# Patient Record
Sex: FEMALE | Race: WHITE | Employment: OTHER | URBAN - METROPOLITAN AREA
[De-identification: names, ages, dates, MRNs, and addresses within clinical notes are randomized per-mention and may not be internally consistent; named-entity substitution may affect disease eponyms.]

---

## 2017-01-09 ENCOUNTER — TELEPHONE (OUTPATIENT)
Dept: FAMILY MEDICINE CLINIC | Facility: CLINIC | Age: 22
End: 2017-01-09

## 2017-01-09 NOTE — TELEPHONE ENCOUNTER
Father placed pt on the phone as no BRNEDA on file. Pt states she continues to have fevers, around 100.5 and is feeling worse than when seen in office. Pt states she saw Dr. Bear Jauregui, infectious disease doctor, last Tuesday.  He did a blood test for iron and a

## 2017-01-09 NOTE — TELEPHONE ENCOUNTER
Discussed with patient and with Dr. Mehran Benjamin. Plan to proceed with indium scan. If negative consider rheumatology evaluation. In addition father questioning whether could be somatization from current stresses.   Patient states she is having more myalgias an

## 2017-01-09 NOTE — TELEPHONE ENCOUNTER
Patient has been experiencing fever and father is calling to obtain direction if patient should see Dr in office or what next steps she should take. Call [phone].

## 2017-01-24 ENCOUNTER — TELEPHONE (OUTPATIENT)
Dept: FAMILY MEDICINE CLINIC | Facility: CLINIC | Age: 22
End: 2017-01-24

## 2017-01-24 NOTE — TELEPHONE ENCOUNTER
Informed pt that we have not received the results as of yet. I also explained to pt that she can sign off on care everywhere and Dr. Terrance Krishna will have access to medical records at [address] [place] [address]. Pt voiced understanding.

## 2017-01-24 NOTE — TELEPHONE ENCOUNTER
Contacted Medical Records at [facility], was instructed to send fax cover sheet with requested information to Medical Records, [phone], was not given time frame for receipt of results.

## 2017-01-24 NOTE — TELEPHONE ENCOUNTER
No, I have not received them. Please call [facility], see if they will send. may require consent.

## 2017-01-24 NOTE — TELEPHONE ENCOUNTER
Katy's dad was in the office today. He wanted to know if you received Katy's Indium test results yet, it was ordered by Dr. Nishi Calderon and she had it done at [facility] 1/18/17 and 1/19/17.  Pls either let Sharyn Wiley know, [phone], or Angeles Baltazar [phone]

## 2017-01-26 ENCOUNTER — TELEPHONE (OUTPATIENT)
Dept: FAMILY MEDICINE CLINIC | Facility: CLINIC | Age: 22
End: 2017-01-26

## 2017-01-26 DIAGNOSIS — R51.9 RECURRENT HEADACHE: ICD-10-CM

## 2017-01-26 DIAGNOSIS — M54.2 NECK PAIN: ICD-10-CM

## 2017-01-26 DIAGNOSIS — R50.9 FEVER OF UNKNOWN ORIGIN: Primary | ICD-10-CM

## 2017-01-26 DIAGNOSIS — M06.1 ADULT STILL'S DISEASE (HCC): ICD-10-CM

## 2017-01-26 NOTE — TELEPHONE ENCOUNTER
Contacted Dr. Zepeda Child. He reports indium scan was normal.  Discussed with patient. She continues to experience daily fevers. They are now more frequent–2 or 3 times a day. And slightly higher T-max 100.6.   Continued headache and neck pain worse wit

## 2017-01-26 NOTE — TELEPHONE ENCOUNTER
Pt's mom Davi Rosen is calling wanting to inform Dr. Rishi Thomas that she did contact Dr. Jason Holden office but they would not schedule an appt for the pt because the pt does not have an official diagnosis.  It does not appear that mom is on pt's HIPAA

## 2017-01-27 ENCOUNTER — TELEPHONE (OUTPATIENT)
Dept: INTERNAL MEDICINE CLINIC | Facility: CLINIC | Age: 22
End: 2017-01-27

## 2017-01-27 NOTE — TELEPHONE ENCOUNTER
Reviewed doctor's recommendations with pt. Pt asking to have copies of lab results for rheumatology appt, she will  at [facility] 1/28/17. Pt rescheduled appt with Dr. Elizabeth Houston to Tuesday 1/31/17.

## 2017-01-31 ENCOUNTER — OFFICE VISIT (OUTPATIENT)
Dept: FAMILY MEDICINE CLINIC | Facility: CLINIC | Age: 22
End: 2017-01-31

## 2017-01-31 VITALS
DIASTOLIC BLOOD PRESSURE: 88 MMHG | RESPIRATION RATE: 18 BRPM | WEIGHT: 181.38 LBS | SYSTOLIC BLOOD PRESSURE: 136 MMHG | HEIGHT: 66.54 IN | HEART RATE: 111 BPM | TEMPERATURE: 99 F | BODY MASS INDEX: 28.81 KG/M2

## 2017-01-31 DIAGNOSIS — R50.9 FEVER OF UNKNOWN ORIGIN: Primary | ICD-10-CM

## 2017-01-31 PROCEDURE — 99212 OFFICE O/P EST SF 10 MIN: CPT | Performed by: FAMILY MEDICINE

## 2017-01-31 PROCEDURE — 99214 OFFICE O/P EST MOD 30 MIN: CPT | Performed by: FAMILY MEDICINE

## 2017-01-31 NOTE — PROGRESS NOTES
HPI:    Patient ID: Reina Sunshine is a 24year old female. HPI Comments: See beow      Review of Systems         Current Outpatient Prescriptions:  MICROGESTIN FE 1.5/30 1.5-30 MG-MCG Oral Tab  Disp:  Rfl:    Adapalene-Benzoyl Peroxide (EPIDUO FORTE) 0. were placed in this encounter.        Meds This Visit:  No prescriptions requested or ordered in this encounter    Imaging & Referrals:  None       #[id number]

## 2017-02-03 ENCOUNTER — HOSPITAL ENCOUNTER (OUTPATIENT)
Dept: MRI IMAGING | Facility: HOSPITAL | Age: 22
Discharge: HOME | End: 2017-02-03
Attending: FAMILY MEDICINE
Payer: COMMERCIAL

## 2017-02-03 DIAGNOSIS — R51.9 RECURRENT HEADACHE: ICD-10-CM

## 2017-02-03 DIAGNOSIS — M54.2 NECK PAIN: ICD-10-CM

## 2017-02-03 DIAGNOSIS — R50.9 FEVER OF UNKNOWN ORIGIN: ICD-10-CM

## 2017-02-03 PROCEDURE — A9575 INJ GADOTERATE MEGLUMI 0.1ML: HCPCS | Performed by: FAMILY MEDICINE

## 2017-02-03 PROCEDURE — 72141 MRI NECK SPINE W/O DYE: CPT

## 2017-02-03 PROCEDURE — 70553 MRI BRAIN STEM W/O & W/DYE: CPT

## 2017-02-03 RX ORDER — ESCITALOPRAM OXALATE 10 MG/1
TABLET ORAL
Qty: 90 TABLET | Refills: 0 | Status: SHIPPED | OUTPATIENT
Start: 2017-02-03 | End: 2017-06-14 | Stop reason: ALTCHOICE

## 2017-02-03 NOTE — TELEPHONE ENCOUNTER
Refill Protocol Appointment Criteria  · Appointment scheduled in the past 6 months or in the next 3 months  Recent Visits       Provider Department Primary Dx    3 days ago Sergei Dutton MD [facility] [place], [facility], [address], [facility] Fever of unknown origin

## 2017-02-08 ENCOUNTER — TELEPHONE (OUTPATIENT)
Dept: FAMILY MEDICINE CLINIC | Facility: CLINIC | Age: 22
End: 2017-02-08

## 2017-02-08 NOTE — TELEPHONE ENCOUNTER
Manual lab requisition at [facility] with [facility] for babesia microti antibodies (IgG, IgM) IFA. [facility] req on [facility] desk.

## 2017-02-17 NOTE — TELEPHONE ENCOUNTER
[phone] would like to inform Dr. Ruth Mota that she is back from [place] and would like to go to the lab today to get her blood work done. Please, call pt when the order is in the system and when she can do to the lab.

## 2017-02-20 NOTE — TELEPHONE ENCOUNTER
Order is ready and phlebotomist has it. It is not in the system as it had to be hand written requisition.   Come in anytime before 4 PM

## 2017-02-20 NOTE — TELEPHONE ENCOUNTER
Left vm for pt informing her that lab order are ordered and [facility] have the written requisition and advised pt of lab hours.  Mon - Fri 8am - 4pm Sat 8am -12pm.

## 2017-02-24 ENCOUNTER — TELEPHONE (OUTPATIENT)
Dept: FAMILY MEDICINE CLINIC | Facility: CLINIC | Age: 22
End: 2017-02-24

## 2017-02-27 NOTE — TELEPHONE ENCOUNTER
LMTCB [id number]    [facility] is out of the office until Tuesday February 18th. Results will be released once [facility] review them.

## 2017-03-09 RX ORDER — ESCITALOPRAM OXALATE 10 MG/1
TABLET ORAL
Qty: 90 TABLET | Refills: 0 | Status: SHIPPED | OUTPATIENT
Start: 2017-03-09 | End: 2017-03-14

## 2017-03-13 ENCOUNTER — TELEPHONE (OUTPATIENT)
Dept: FAMILY MEDICINE CLINIC | Facility: CLINIC | Age: 22
End: 2017-03-13

## 2017-03-13 NOTE — TELEPHONE ENCOUNTER
Call transferred to RN from [address] [place] [address]    Reason for Call/Chief Complaint: pt has history of fevers and is feeling worse today  Onset: last night  Nursing Assessment/Associated Symptoms: pt is aching a lot, feeling shaky and weak.  Feels like she can't take a deep

## 2017-03-13 NOTE — TELEPHONE ENCOUNTER
Pt. States that she continues to have fevers on and off since Oct./2016, and is feeling worse, and that it is affecting her breathing, having SOB.

## 2017-03-14 ENCOUNTER — OFFICE VISIT (OUTPATIENT)
Dept: FAMILY MEDICINE CLINIC | Facility: CLINIC | Age: 22
End: 2017-03-14

## 2017-03-14 ENCOUNTER — LAB ENCOUNTER (OUTPATIENT)
Dept: LAB | Age: 22
End: 2017-03-14
Attending: FAMILY MEDICINE
Payer: COMMERCIAL

## 2017-03-14 VITALS
SYSTOLIC BLOOD PRESSURE: 126 MMHG | BODY MASS INDEX: 29.22 KG/M2 | DIASTOLIC BLOOD PRESSURE: 89 MMHG | TEMPERATURE: 100 F | WEIGHT: 184 LBS | HEIGHT: 66.54 IN | RESPIRATION RATE: 17 BRPM | HEART RATE: 132 BPM

## 2017-03-14 DIAGNOSIS — R00.0 TACHYCARDIA: ICD-10-CM

## 2017-03-14 DIAGNOSIS — M79.10 MYALGIA: ICD-10-CM

## 2017-03-14 DIAGNOSIS — R50.9 FEVER OF UNKNOWN ORIGIN: Primary | ICD-10-CM

## 2017-03-14 DIAGNOSIS — R50.9 FEVER OF UNKNOWN ORIGIN: ICD-10-CM

## 2017-03-14 LAB
ANION GAP SERPL CALC-SCNC: 10 MMOL/L (ref 0–18)
BASOPHILS # BLD: 0.1 K/UL (ref 0–0.2)
BASOPHILS NFR BLD: 2 %
BUN SERPL-MCNC: 6 MG/DL (ref 8–20)
BUN/CREAT SERPL: 6.8 (ref 10–20)
CALCIUM SERPL-MCNC: 9.7 MG/DL (ref 8.5–10.5)
CHLORIDE SERPL-SCNC: 106 MMOL/L (ref 95–110)
CO2 SERPL-SCNC: 23 MMOL/L (ref 22–32)
CREAT SERPL-MCNC: 0.88 MG/DL (ref 0.5–1.5)
CRP SERPL-MCNC: <0.5 MG/DL (ref 0–0.9)
EOSINOPHIL # BLD: 0 K/UL (ref 0–0.7)
EOSINOPHIL NFR BLD: 1 %
ERYTHROCYTE [DISTWIDTH] IN BLOOD BY AUTOMATED COUNT: 13.9 % (ref 11–15)
ERYTHROCYTE [SEDIMENTATION RATE] IN BLOOD: 8 MM/HR (ref 0–20)
GLUCOSE SERPL-MCNC: 128 MG/DL (ref 70–99)
HCT VFR BLD AUTO: 41 % (ref 35–48)
HGB BLD-MCNC: 13.6 G/DL (ref 12–16)
LYMPHOCYTES # BLD: 2.1 K/UL (ref 1–4)
LYMPHOCYTES NFR BLD: 41 %
MCH RBC QN AUTO: 29.3 PG (ref 27–32)
MCHC RBC AUTO-ENTMCNC: 33.2 G/DL (ref 32–37)
MCV RBC AUTO: 88 FL (ref 80–100)
MONOCYTES # BLD: 0.2 K/UL (ref 0–1)
MONOCYTES NFR BLD: 5 %
NEUTROPHILS # BLD AUTO: 2.7 K/UL (ref 1.8–7.7)
NEUTROPHILS NFR BLD: 52 %
OSMOLALITY UR CALC.SUM OF ELEC: 287 MOSM/KG (ref 275–295)
PLATELET # BLD AUTO: 317 K/UL (ref 140–400)
PMV BLD AUTO: 8.5 FL (ref 7.4–10.3)
POTASSIUM SERPL-SCNC: 3.1 MMOL/L (ref 3.3–5.1)
RBC # BLD AUTO: 4.66 M/UL (ref 3.7–5.4)
SODIUM SERPL-SCNC: 139 MMOL/L (ref 136–144)
TSH SERPL-ACNC: 2.41 UIU/ML (ref 0.34–5.6)
WBC # BLD AUTO: 5.3 K/UL (ref 4–11)

## 2017-03-14 PROCEDURE — 99213 OFFICE O/P EST LOW 20 MIN: CPT | Performed by: FAMILY MEDICINE

## 2017-03-14 PROCEDURE — 99212 OFFICE O/P EST SF 10 MIN: CPT | Performed by: FAMILY MEDICINE

## 2017-03-14 PROCEDURE — 84443 ASSAY THYROID STIM HORMONE: CPT

## 2017-03-14 PROCEDURE — 86140 C-REACTIVE PROTEIN: CPT

## 2017-03-14 PROCEDURE — 36415 COLL VENOUS BLD VENIPUNCTURE: CPT

## 2017-03-14 PROCEDURE — 85025 COMPLETE CBC W/AUTO DIFF WBC: CPT

## 2017-03-14 PROCEDURE — 85379 FIBRIN DEGRADATION QUANT: CPT

## 2017-03-14 PROCEDURE — 80048 BASIC METABOLIC PNL TOTAL CA: CPT

## 2017-03-14 PROCEDURE — 85652 RBC SED RATE AUTOMATED: CPT

## 2017-03-14 NOTE — PROGRESS NOTES
HPI:    Patient ID: Lynne Linares is a 25year old female.     HPI Comments: See below      Review of Systems         Current Outpatient Prescriptions:  ESCITALOPRAM 10 MG Oral Tab TAKE ONE TABLET BY MOUTH ONCE DAILY Disp: 90 tablet Rfl: 0   MICROGESTIN FE [E]  Sed Rate, Westergren (Automated) [E]  Basic Metabolic Panel (8) [E]  C-Reactive Protein [E]  TSH [E]    Meds This Visit:  No prescriptions requested or ordered in this encounter    Imaging & Referrals:  CARD ECHO 2D DOPPLER (CPT=93306)       RO#[id number]

## 2017-03-15 LAB — D DIMER PPP FEU-MCNC: 0.36 MCG/ML (ref ?–0.5)

## 2017-03-17 RX ORDER — NORETHINDRONE ACETATE AND ETHINYL ESTRADIOL AND FERROUS FUMARATE 1.5-30(21)
KIT ORAL
Qty: 84 TABLET | Refills: 4 | Status: SHIPPED | OUTPATIENT
Start: 2017-03-17 | End: 2018-02-27

## 2017-03-29 ENCOUNTER — TELEPHONE (OUTPATIENT)
Dept: FAMILY MEDICINE CLINIC | Facility: CLINIC | Age: 22
End: 2017-03-29

## 2017-03-29 NOTE — TELEPHONE ENCOUNTER
Call from Dr. Andria Luna. Workup continues to be negative for infectious cause. He suspects JRA or adult Still's.

## 2017-04-07 ENCOUNTER — HOSPITAL ENCOUNTER (OUTPATIENT)
Dept: CV DIAGNOSTICS | Facility: HOSPITAL | Age: 22
Discharge: HOME | End: 2017-04-07
Attending: FAMILY MEDICINE
Payer: COMMERCIAL

## 2017-04-07 ENCOUNTER — APPOINTMENT (OUTPATIENT)
Dept: LAB | Facility: HOSPITAL | Age: 22
End: 2017-04-07
Attending: FAMILY MEDICINE
Payer: COMMERCIAL

## 2017-04-07 DIAGNOSIS — R50.9 FEVER OF UNKNOWN ORIGIN: ICD-10-CM

## 2017-04-07 DIAGNOSIS — R00.0 TACHYCARDIA: ICD-10-CM

## 2017-04-07 PROCEDURE — 93306 TTE W/DOPPLER COMPLETE: CPT | Performed by: INTERNAL MEDICINE

## 2017-04-07 PROCEDURE — 87040 BLOOD CULTURE FOR BACTERIA: CPT | Performed by: FAMILY MEDICINE

## 2017-04-07 PROCEDURE — 93306 TTE W/DOPPLER COMPLETE: CPT

## 2017-05-03 ENCOUNTER — TELEPHONE (OUTPATIENT)
Dept: FAMILY MEDICINE CLINIC | Facility: CLINIC | Age: 22
End: 2017-05-03

## 2017-05-04 NOTE — TELEPHONE ENCOUNTER
Please let her know that would be okay, recommendtake half tablet daily for 5 days then stop to minimize withdrawal symproms

## 2017-05-04 NOTE — TELEPHONE ENCOUNTER
Reviewed doctor's medication instructions below with pt, pt agreed with plan of care and had no further questions at this time.

## 2017-06-02 ENCOUNTER — TELEPHONE (OUTPATIENT)
Dept: FAMILY MEDICINE CLINIC | Facility: CLINIC | Age: 22
End: 2017-06-02

## 2017-06-14 ENCOUNTER — OFFICE VISIT (OUTPATIENT)
Dept: FAMILY MEDICINE CLINIC | Facility: CLINIC | Age: 22
End: 2017-06-14

## 2017-06-14 VITALS
HEART RATE: 111 BPM | TEMPERATURE: 99 F | BODY MASS INDEX: 28.65 KG/M2 | WEIGHT: 180.38 LBS | DIASTOLIC BLOOD PRESSURE: 82 MMHG | HEIGHT: 66.53 IN | SYSTOLIC BLOOD PRESSURE: 137 MMHG | RESPIRATION RATE: 19 BRPM

## 2017-06-14 DIAGNOSIS — M79.7 FIBROMYALGIA: Primary | ICD-10-CM

## 2017-06-14 PROCEDURE — 99212 OFFICE O/P EST SF 10 MIN: CPT | Performed by: FAMILY MEDICINE

## 2017-06-14 PROCEDURE — 99214 OFFICE O/P EST MOD 30 MIN: CPT | Performed by: FAMILY MEDICINE

## 2017-06-14 RX ORDER — DULOXETIN HYDROCHLORIDE 60 MG/1
60 CAPSULE, DELAYED RELEASE ORAL DAILY
Qty: 30 CAPSULE | Refills: 3 | Status: SHIPPED | OUTPATIENT
Start: 2017-06-14 | End: 2017-07-24 | Stop reason: ALTCHOICE

## 2017-06-14 NOTE — PROGRESS NOTES
HPI:    Patient ID: Clint France is a 25year old female. Fever   This is a recurrent problem. The current episode started more than 1 month ago. The problem occurs daily. The problem has been waxing and waning.  The maximum temperature noted was 99 to negative. At follow-up visit with rheumatologist they suggested follow-up with primary care for treatment of probable fibromyalgia. Reviewed criteria today. Discussed options for treatment.   Patient already taking Lexapro, plan to discontinue this and t

## 2017-07-24 ENCOUNTER — OFFICE VISIT (OUTPATIENT)
Dept: FAMILY MEDICINE CLINIC | Facility: CLINIC | Age: 22
End: 2017-07-24

## 2017-07-24 ENCOUNTER — APPOINTMENT (OUTPATIENT)
Dept: LAB | Age: 22
End: 2017-07-24
Attending: FAMILY MEDICINE
Payer: COMMERCIAL

## 2017-07-24 ENCOUNTER — HOSPITAL ENCOUNTER (OUTPATIENT)
Dept: GENERAL RADIOLOGY | Age: 22
Discharge: HOME | End: 2017-07-24
Attending: FAMILY MEDICINE
Payer: COMMERCIAL

## 2017-07-24 VITALS
BODY MASS INDEX: 28.11 KG/M2 | SYSTOLIC BLOOD PRESSURE: 128 MMHG | DIASTOLIC BLOOD PRESSURE: 93 MMHG | TEMPERATURE: 99 F | WEIGHT: 177 LBS | HEIGHT: 66.53 IN | RESPIRATION RATE: 17 BRPM | HEART RATE: 128 BPM

## 2017-07-24 DIAGNOSIS — R00.0 TACHYCARDIA: ICD-10-CM

## 2017-07-24 DIAGNOSIS — F41.1 GAD (GENERALIZED ANXIETY DISORDER): ICD-10-CM

## 2017-07-24 DIAGNOSIS — M79.10 MYALGIA: ICD-10-CM

## 2017-07-24 DIAGNOSIS — M79.7 FIBROMYALGIA: ICD-10-CM

## 2017-07-24 DIAGNOSIS — M79.7 FIBROMYALGIA: Primary | ICD-10-CM

## 2017-07-24 LAB — VIT B12 SERPL-MCNC: 345 PG/ML (ref 181–914)

## 2017-07-24 PROCEDURE — 82306 VITAMIN D 25 HYDROXY: CPT

## 2017-07-24 PROCEDURE — 99212 OFFICE O/P EST SF 10 MIN: CPT | Performed by: FAMILY MEDICINE

## 2017-07-24 PROCEDURE — 99214 OFFICE O/P EST MOD 30 MIN: CPT | Performed by: FAMILY MEDICINE

## 2017-07-24 PROCEDURE — 73552 X-RAY EXAM OF FEMUR 2/>: CPT | Performed by: FAMILY MEDICINE

## 2017-07-24 PROCEDURE — 82607 VITAMIN B-12: CPT

## 2017-07-24 RX ORDER — ESCITALOPRAM OXALATE 10 MG/1
10 TABLET ORAL DAILY
Qty: 30 TABLET | Refills: 2 | Status: SHIPPED | OUTPATIENT
Start: 2017-07-24 | End: 2017-09-11

## 2017-07-24 NOTE — PROGRESS NOTES
HPI:    Patient ID: Marie Young is a 25year old female. Fibromyalgia   The current episode started more than 1 month ago. The problem has been unchanged. Associated symptoms include fatigue, a fever and myalgias.  Pertinent negatives include no abdomi disorder)     Fibromyalgia–patient diagnosed with probable fibromyalgia by rheumatology. We started Cymbalta 6 weeks ago. She has had no improvement in myalgias, headaches. She had side effect of feeling fidgety.   She has had continued bilateral thigh p

## 2017-07-26 LAB — 25(OH)D3 SERPL-MCNC: 39.9 NG/ML

## 2017-07-28 ENCOUNTER — TELEPHONE (OUTPATIENT)
Dept: FAMILY MEDICINE CLINIC | Facility: CLINIC | Age: 22
End: 2017-07-28

## 2017-07-28 NOTE — TELEPHONE ENCOUNTER
Actions Requested: Dr Gerald Blandon, please review and note update and any recommendations.    Problem: fibromyalgia, worsening bodyaches this week with change of medication  Onset and Timing: this week with change of medication  Associated Symptoms:   Aggravating

## 2017-07-28 NOTE — TELEPHONE ENCOUNTER
Patient contacted. May be withdrawal symptoms versus recurrence of symptoms. Plan to continue with Lexapro and off of Cymbalta and call back with update in 4 days.   If no improvement in symptoms we will restart Cymbalta

## 2017-08-01 ENCOUNTER — TELEPHONE (OUTPATIENT)
Dept: FAMILY MEDICINE CLINIC | Facility: CLINIC | Age: 22
End: 2017-08-01

## 2017-08-01 NOTE — TELEPHONE ENCOUNTER
Pt states  [facility] advised her to call her to let her know how she is felling. Pt states pain has decreased since last time she talked to  [facility] and she states she is not 100%.

## 2017-08-02 ENCOUNTER — OFFICE VISIT (OUTPATIENT)
Dept: ENDOCRINOLOGY CLINIC | Facility: CLINIC | Age: 22
End: 2017-08-02

## 2017-08-02 ENCOUNTER — APPOINTMENT (OUTPATIENT)
Dept: LAB | Age: 22
End: 2017-08-02
Attending: INTERNAL MEDICINE
Payer: COMMERCIAL

## 2017-08-02 VITALS
SYSTOLIC BLOOD PRESSURE: 154 MMHG | HEART RATE: 120 BPM | HEIGHT: 66 IN | WEIGHT: 176 LBS | BODY MASS INDEX: 28.28 KG/M2 | DIASTOLIC BLOOD PRESSURE: 97 MMHG

## 2017-08-02 DIAGNOSIS — R23.2 FLUSHING: Primary | ICD-10-CM

## 2017-08-02 DIAGNOSIS — R23.2 FLUSHING: ICD-10-CM

## 2017-08-02 PROCEDURE — 99243 OFF/OP CNSLTJ NEW/EST LOW 30: CPT | Performed by: INTERNAL MEDICINE

## 2017-08-02 PROCEDURE — 99212 OFFICE O/P EST SF 10 MIN: CPT | Performed by: INTERNAL MEDICINE

## 2017-08-02 PROCEDURE — 83835 ASSAY OF METANEPHRINES: CPT

## 2017-08-02 PROCEDURE — 36415 COLL VENOUS BLD VENIPUNCTURE: CPT

## 2017-08-02 NOTE — TELEPHONE ENCOUNTER
Patient contacted. Exacerbation of pain last week after stopping Cymbalta has improved but not resolved. Anxiety is improved. She will be traveling to [facility] later this week.   If endocrinology evaluation negative and continued pain may consider

## 2017-08-02 NOTE — H&P
New Patient Evaluation - History and Physical    CONSULT - Reason for Visit: fatigue, low grade fever  Requesting Physician: Dr. Kelly Padilla:  Patient presents with:  Fatigue: recheck BP       HISTORY OF PRESENT ILLNESS:   Tania Patel is a 25 Smokeless tobacco: Never Used                        Alcohol use: No              Drug use:  No              Sexual activity: No                   Other Topics            Concern  Pt has a pacemaker      No  Pt h rhythm, normal S1 and S2  ABDOMEN:  normal bowel sounds, soft, non-distended, non-tender  SKIN:  no bruising or bleeding, no rashes and no lesions  EXTREMITIES:normal pulses, no edema      DATA:       Reviewed.       ASSESSMENT AND PLAN:    Patient is 22 ye

## 2017-08-06 LAB
METANEPHRINE: 0.19 NMOL/L
NORMETANEPHRINE: 0.59 NMOL/L

## 2017-08-07 ENCOUNTER — TELEPHONE (OUTPATIENT)
Dept: ENDOCRINOLOGY CLINIC | Facility: CLINIC | Age: 22
End: 2017-08-07

## 2017-08-07 NOTE — TELEPHONE ENCOUNTER
Shell Daughters returned call. Let her know per AM metanephrines were normal but waiting rest of urine testing.  Will contact her once results final

## 2017-08-21 ENCOUNTER — APPOINTMENT (OUTPATIENT)
Dept: LAB | Age: 22
End: 2017-08-21
Attending: INTERNAL MEDICINE
Payer: COMMERCIAL

## 2017-08-21 DIAGNOSIS — R23.2 FLUSHING: ICD-10-CM

## 2017-08-21 LAB
CREAT 24H UR-MRATE: 2.2 G/24HR (ref 0.6–1.8)
SPECIMEN VOL 24H UR: 2100 ML/24H

## 2017-08-21 PROCEDURE — 83497 ASSAY OF 5-HIAA: CPT

## 2017-08-21 PROCEDURE — 82570 ASSAY OF URINE CREATININE: CPT

## 2017-08-24 ENCOUNTER — TELEPHONE (OUTPATIENT)
Dept: ENDOCRINOLOGY CLINIC | Facility: CLINIC | Age: 22
End: 2017-08-24

## 2017-08-24 LAB
5-HIAA URINE - PER 24H: 5 MG/D
5-HIAA URINE - PER VOLUME: 2.5 MG/L
5-HIAA URINE - RATIO TO CRT: 2 MG/GCR
CREATININE, URINE - PER 24H: 2163 MG/D
CREATININE, URINE - PER VOLUME: 103 MG/DL
HOURS COLLECTED: 24 HR
TOTAL VOLUME: 2100 ML

## 2017-08-25 NOTE — TELEPHONE ENCOUNTER
Called Rg Maloney and let her know per AM urine testing normal and so no hormonal cause for symptoms. Discussed that she should follow up with Dr. Tj Barger.

## 2017-08-25 NOTE — TELEPHONE ENCOUNTER
Endo nurses:    Please let the patient know that results of the urine testing are normal.   Unfortunately. We have not found a hormonal cause of her symptoms.    I recommend following up  With Dr. Samina Sandoval

## 2017-08-28 ENCOUNTER — TELEPHONE (OUTPATIENT)
Dept: FAMILY MEDICINE CLINIC | Facility: CLINIC | Age: 22
End: 2017-08-28

## 2017-08-28 NOTE — TELEPHONE ENCOUNTER
Thanks, will proceed with [facility] consultation

## 2017-08-28 NOTE — TELEPHONE ENCOUNTER
Contacted patient to discuss negative evaluation for carcinoid syndrome. Agreed with ongoing low-grade temperature, myalgias, tachycardia workup at [facility] still indicated. [facility], gave information. She was given patient ID #[id number].

## 2017-09-11 ENCOUNTER — TELEPHONE (OUTPATIENT)
Dept: FAMILY MEDICINE CLINIC | Facility: CLINIC | Age: 22
End: 2017-09-11

## 2017-09-11 RX ORDER — ESCITALOPRAM OXALATE 10 MG/1
10 TABLET ORAL DAILY
Qty: 90 TABLET | Refills: 0 | Status: SHIPPED | OUTPATIENT
Start: 2017-09-11 | End: 2017-12-10

## 2017-09-11 NOTE — TELEPHONE ENCOUNTER
Refill Protocol Appointment Criteria  · Appointment scheduled in the past 6 months or in the next 3 months  Recent Outpatient Visits            1 month ago Kannan Mojica MD    Office Visit    1 month a

## 2017-09-11 NOTE — TELEPHONE ENCOUNTER
Tiffany Singer needs a refill of:    •  escitalopram (LEXAPRO) 10 MG Oral Tab, Take 1 tablet (10 mg total) by mouth daily. , Disp: 30 tablet, Rfl: 2

## 2017-10-02 ENCOUNTER — HOSPITAL ENCOUNTER (OUTPATIENT)
Age: 22
Discharge: HOME | End: 2017-10-02
Attending: FAMILY MEDICINE
Payer: COMMERCIAL

## 2017-10-02 ENCOUNTER — NURSE TRIAGE (OUTPATIENT)
Dept: OTHER | Age: 22
End: 2017-10-02

## 2017-10-02 VITALS
RESPIRATION RATE: 16 BRPM | TEMPERATURE: 99 F | BODY MASS INDEX: 26 KG/M2 | HEART RATE: 98 BPM | WEIGHT: 160 LBS | DIASTOLIC BLOOD PRESSURE: 87 MMHG | SYSTOLIC BLOOD PRESSURE: 159 MMHG | OXYGEN SATURATION: 100 %
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
## 2017-10-02 PROCEDURE — 99204 OFFICE O/P NEW MOD 45 MIN: CPT

## 2017-10-02 PROCEDURE — 99213 OFFICE O/P EST LOW 20 MIN: CPT

## 2017-10-02 RX ORDER — AMOXICILLIN AND CLAVULANATE POTASSIUM 875; 125 MG/1; MG/1
1 TABLET, FILM COATED ORAL 2 TIMES DAILY
Qty: 6 TABLET | Refills: 0 | Status: SHIPPED | OUTPATIENT
Start: 2017-10-02 | End: 2017-10-05

## 2017-10-02 RX ORDER — AMOXICILLIN AND CLAVULANATE POTASSIUM 875; 125 MG/1; MG/1
1 TABLET, FILM COATED ORAL 2 TIMES DAILY
Qty: 20 TABLET | Refills: 0 | Status: SHIPPED | OUTPATIENT
Start: 2017-10-02 | End: 2017-10-02 | Stop reason: DRUGHIGH

## 2017-10-02 NOTE — ED PROVIDER NOTES
Patient Seen in: [address]    History   Patient presents with:  Upper Extremity Injury (musculoskeletal)    Stated Complaint: right thumb bitten by turtle    HPI    20-year-old female presents after internal bite to the well-developed and well-nourished. Cardiovascular: Normal rate, regular rhythm, normal heart sounds and intact distal pulses. Pulmonary/Chest: Effort normal and breath sounds normal.   Abdominal: Soft.  Bowel sounds are normal.   Musculoskeletal: Rosy Cox

## 2017-10-02 NOTE — TELEPHONE ENCOUNTER
Hard to say since I cannot see the wound. Some turtle bites need to be treated depending on their size, position, and depth due to the risk of salmonella. She may want to go to .

## 2017-10-02 NOTE — ED INITIAL ASSESSMENT (HPI)
Pt reports being bitten by pet turtle today at about 0930. Reports pain 1/10. Pt able to move thumb without difficulty. No bleeding noted.

## 2017-10-02 NOTE — ED NOTES
Pt given discharge instructions and prescriptions, verbalizes understanding. Denies further questions. Encouraged to call with questions and go to ED with new or worsening symptoms. Ambulatory out of immediate care with steady gait in no apparent distress.

## 2017-10-02 NOTE — TELEPHONE ENCOUNTER
Pt was inform of Dr.Weiler message below and she stated that she will monitor and might go to [facility] or a walk in clinic. Rn pls f/u tomorrow. Thanks

## 2017-10-02 NOTE — TELEPHONE ENCOUNTER
Action Requested: Summary for Provider     []  Critical Lab, Recommendations Needed  [x] Need Additional Advice  []   FYI    []   Need Orders  [] Need Medications Sent to Pharmacy  []  Other     SUMMARY: Dr. Cesar Rondon: please advise if home care is sufficient

## 2017-10-04 NOTE — TELEPHONE ENCOUNTER
LMTCB please transfer to triage. Thanks  I will send a no response letter through [employer]. Thanks

## 2017-10-09 RX ORDER — ADAPALENE AND BENZOYL PEROXIDE 3; 25 MG/G; MG/G
GEL TOPICAL
Qty: 45 G | Refills: 0 | Status: SHIPPED | OUTPATIENT
Start: 2017-10-09 | End: 2018-12-01 | Stop reason: ALTCHOICE

## 2017-10-16 ENCOUNTER — TELEPHONE (OUTPATIENT)
Dept: FAMILY MEDICINE CLINIC | Facility: CLINIC | Age: 22
End: 2017-10-16

## 2017-10-16 NOTE — TELEPHONE ENCOUNTER
Joshua Woo stopped in the [facility] office today. She'll be going to the [facility] clinic in about 3 weeks and left a list of of items she has to bring with her to her appointment, the list is in Dr. Valerio Brochure.  Pls call Joshua Woo when everything is ready for pic

## 2017-10-18 NOTE — TELEPHONE ENCOUNTER
I received information from [facility]. Will write letter regarding reason for consult.   Everardo Grandgarland will contact patient regarding obtaining  records

## 2017-10-20 NOTE — TELEPHONE ENCOUNTER
Request to release pt's medical records sent to [facility]. Letter from [facility] included. They will have pt's records and CDs ready for p/u on Tuesday.  Pt contacted, and advised that a letter from Dr Dowell Portal and copy of [facility] records are ready for p/u,

## 2017-10-20 NOTE — TELEPHONE ENCOUNTER
Letter printed. Devin Silva will contact patient and advise her regarding picking up additional records. She will need CD with MRI results from 2/3/17, as well as x-rays from 12/21/16 and 7/24/17. She will need all labs and office notes for past 2 years.  BRENDA tavarez

## 2017-11-03 ENCOUNTER — OFFICE VISIT (OUTPATIENT)
Dept: DERMATOLOGY CLINIC | Facility: CLINIC | Age: 22
End: 2017-11-03

## 2017-11-03 DIAGNOSIS — L70.0 ACNE VULGARIS: Primary | ICD-10-CM

## 2017-11-03 PROCEDURE — 99213 OFFICE O/P EST LOW 20 MIN: CPT | Performed by: DERMATOLOGY

## 2017-11-03 PROCEDURE — 99212 OFFICE O/P EST SF 10 MIN: CPT | Performed by: DERMATOLOGY

## 2017-11-03 RX ORDER — CLOTRIMAZOLE 1 %
CREAM (GRAM) TOPICAL
Qty: 60 G | Refills: 3 | Status: SHIPPED | OUTPATIENT
Start: 2017-11-03 | End: 2018-12-01 | Stop reason: ALTCHOICE

## 2017-11-03 RX ORDER — ERYTHROMYCIN AND BENZOYL PEROXIDE 30; 50 MG/G; MG/G
GEL TOPICAL
Qty: 50 G | Refills: 2 | Status: SHIPPED | OUTPATIENT
Start: 2017-11-03 | End: 2018-06-12

## 2017-11-03 RX ORDER — DOXYCYCLINE HYCLATE 50 MG/1
50 CAPSULE ORAL DAILY
Qty: 30 CAPSULE | Refills: 12 | Status: SHIPPED | OUTPATIENT
Start: 2017-11-03 | End: 2018-11-03

## 2017-11-13 NOTE — PROGRESS NOTES
Hank Giang is a 25year old female. Patient presents with:  Acne: LOV 08/12/16 pt was seen for acne was using Epiduo for tx and currently still uses it states that her acne is about the same-JH            Patient has no known allergies.     Current Ou THE AFFECTED AREA EVERY DAY AFTER WASHING AS DIRECTED Disp: 45 g Rfl: 0     Allergies:   No Known Allergies    Past Medical History:   Diagnosis Date   • Acne    • Depression with anxiety 7/29/2014   • Migraine 2008   • Other acne 8/25/2014     No past ailyn Back, chest, shoulders, neck: Hyperpigmented scaling patches at inframammary creases.   Nevi unchanged    No hirsutism   ,              no alopecia  ASSESSMENT AND PLAN:     Acne vulgaris  (primary encounter diagnosis)    Assessment/ Plan:   Acne inflam

## 2017-11-14 ENCOUNTER — TELEPHONE (OUTPATIENT)
Dept: OTHER | Age: 22
End: 2017-11-14

## 2017-11-14 DIAGNOSIS — R50.9 FUO (FEVER OF UNKNOWN ORIGIN): Primary | ICD-10-CM

## 2017-11-14 NOTE — TELEPHONE ENCOUNTER
Spoke with pt and verified pt . Pt states just came back from [facility] and would like to speak directly with Dr Jennifer Huerta to update her. Pt is asking if Dr Jennifer Huerta will call her to discuss.       Will forward to MD.

## 2017-11-15 NOTE — TELEPHONE ENCOUNTER
Patient contacted. Discussed [facility] evaluation. She will send us a copy of information received so far. She reports they had recommended a CT of her neck, chest, abdomen/pelvis. After receiving records will complete orders.   They also recommended

## 2017-11-20 ENCOUNTER — HOSPITAL ENCOUNTER (OUTPATIENT)
Dept: CT IMAGING | Facility: HOSPITAL | Age: 22
Discharge: HOME | End: 2017-11-20
Attending: FAMILY MEDICINE
Payer: COMMERCIAL

## 2017-11-20 ENCOUNTER — HOSPITAL ENCOUNTER (OUTPATIENT)
Dept: CT IMAGING | Facility: HOSPITAL | Age: 22
End: 2017-11-20
Attending: FAMILY MEDICINE
Payer: COMMERCIAL

## 2017-11-20 DIAGNOSIS — R50.9 FUO (FEVER OF UNKNOWN ORIGIN): ICD-10-CM

## 2017-11-20 PROCEDURE — 70491 CT SOFT TISSUE NECK W/DYE: CPT | Performed by: FAMILY MEDICINE

## 2017-11-20 PROCEDURE — 74177 CT ABD & PELVIS W/CONTRAST: CPT | Performed by: FAMILY MEDICINE

## 2017-11-20 PROCEDURE — 82565 ASSAY OF CREATININE: CPT

## 2017-11-20 PROCEDURE — 71260 CT THORAX DX C+: CPT | Performed by: FAMILY MEDICINE

## 2017-12-05 ENCOUNTER — OFFICE VISIT (OUTPATIENT)
Dept: FAMILY MEDICINE CLINIC | Facility: CLINIC | Age: 22
End: 2017-12-05

## 2017-12-05 VITALS
HEART RATE: 102 BPM | BODY MASS INDEX: 30.18 KG/M2 | HEIGHT: 66 IN | TEMPERATURE: 99 F | DIASTOLIC BLOOD PRESSURE: 84 MMHG | SYSTOLIC BLOOD PRESSURE: 132 MMHG | RESPIRATION RATE: 16 BRPM | WEIGHT: 187.81 LBS

## 2017-12-05 DIAGNOSIS — F41.8 DEPRESSION WITH ANXIETY: ICD-10-CM

## 2017-12-05 DIAGNOSIS — R50.9 FUO (FEVER OF UNKNOWN ORIGIN): ICD-10-CM

## 2017-12-05 DIAGNOSIS — M79.10 MYALGIA: Primary | ICD-10-CM

## 2017-12-05 PROCEDURE — 99212 OFFICE O/P EST SF 10 MIN: CPT | Performed by: FAMILY MEDICINE

## 2017-12-05 PROCEDURE — 90686 IIV4 VACC NO PRSV 0.5 ML IM: CPT | Performed by: FAMILY MEDICINE

## 2017-12-05 PROCEDURE — 99214 OFFICE O/P EST MOD 30 MIN: CPT | Performed by: FAMILY MEDICINE

## 2017-12-05 PROCEDURE — 90471 IMMUNIZATION ADMIN: CPT | Performed by: FAMILY MEDICINE

## 2017-12-05 NOTE — PROGRESS NOTES
HPI:    Patient ID: Janeth Darden is a 25year old female. Fever    This is a chronic problem. The current episode started more than 1 year ago. The problem occurs intermittently. The problem has been waxing and waning.  The maximum temperature noted was Neurological: She is alert and oriented to person, place, and time. Skin: Skin is warm and dry.               ASSESSMENT/PLAN:   Myalgia  (primary encounter diagnosis)  Fuo (fever of unknown origin)  Depression with anxiety     Myalgias–patient has a 13

## 2017-12-11 ENCOUNTER — OFFICE VISIT (OUTPATIENT)
Dept: INTEGRATIVE MEDICINE | Facility: HOSPITAL | Age: 22
End: 2017-12-11
Payer: COMMERCIAL

## 2017-12-11 DIAGNOSIS — M79.10 MYALGIA: ICD-10-CM

## 2017-12-11 PROCEDURE — 97810 ACUP 1/> WO ESTIM 1ST 15 MIN: CPT | Performed by: ACUPUNCTURIST

## 2017-12-11 PROCEDURE — 99202 OFFICE O/P NEW SF 15 MIN: CPT | Performed by: ACUPUNCTURIST

## 2017-12-11 PROCEDURE — 97811 ACUP 1/> W/O ESTIM EA ADD 15: CPT | Performed by: ACUPUNCTURIST

## 2017-12-12 ENCOUNTER — PATIENT MESSAGE (OUTPATIENT)
Dept: FAMILY MEDICINE CLINIC | Facility: CLINIC | Age: 22
End: 2017-12-12

## 2017-12-12 DIAGNOSIS — R50.9 INTERMITTENT FUO: Primary | ICD-10-CM

## 2017-12-12 RX ORDER — ESCITALOPRAM OXALATE 10 MG/1
10 TABLET ORAL DAILY
Qty: 90 TABLET | Refills: 0 | Status: SHIPPED | OUTPATIENT
Start: 2017-12-12 | End: 2018-03-14

## 2017-12-12 NOTE — TELEPHONE ENCOUNTER
From: Ingrid Broussard  To: Adiel Mishra MD  Sent: 12/12/2017 11:10 AM CST  Subject: Test Results Michelle Sandoval,    Dr. Cale Cordon thought it might be good to get a stool sample. I don't think we've done that.  Wondered what you thought, and if

## 2017-12-12 NOTE — TELEPHONE ENCOUNTER
ESCITALOPRAM Medication refilled for 90 days per protocol.     Refill Protocol Appointment Criteria  · Appointment scheduled in the past 6 months or in the next 3 months  Recent Outpatient Visits            1 week ago Beverly Garza

## 2017-12-15 ENCOUNTER — OFFICE VISIT (OUTPATIENT)
Dept: INTEGRATIVE MEDICINE | Facility: HOSPITAL | Age: 22
End: 2017-12-15
Attending: FAMILY MEDICINE
Payer: COMMERCIAL

## 2017-12-15 DIAGNOSIS — M79.10 MYALGIA: Primary | ICD-10-CM

## 2017-12-15 PROCEDURE — 97814 ACUP 1/> W/ESTIM EA ADDL 15: CPT | Performed by: ACUPUNCTURIST

## 2017-12-15 PROCEDURE — 97813 ACUP 1/> W/ESTIM 1ST 15 MIN: CPT | Performed by: ACUPUNCTURIST

## 2017-12-15 PROCEDURE — 97810 ACUP 1/> WO ESTIM 1ST 15 MIN: CPT | Performed by: ACUPUNCTURIST

## 2017-12-18 ENCOUNTER — OFFICE VISIT (OUTPATIENT)
Dept: INTEGRATIVE MEDICINE | Facility: HOSPITAL | Age: 22
End: 2017-12-18
Attending: FAMILY MEDICINE
Payer: COMMERCIAL

## 2017-12-18 DIAGNOSIS — M79.10 MYALGIA: Primary | ICD-10-CM

## 2017-12-18 PROCEDURE — 97814 ACUP 1/> W/ESTIM EA ADDL 15: CPT | Performed by: ACUPUNCTURIST

## 2017-12-18 PROCEDURE — 97810 ACUP 1/> WO ESTIM 1ST 15 MIN: CPT | Performed by: ACUPUNCTURIST

## 2017-12-21 NOTE — PROGRESS NOTES
Arya Bond  Integrative Medicine         Rg Maloney came today having a low grade fever since October with body aches the temperature fluctuates between 100.5 or 99.8. She has a constant low energy.  I reccomended a stool test to rule out parasite or

## 2017-12-22 ENCOUNTER — APPOINTMENT (OUTPATIENT)
Dept: INTEGRATIVE MEDICINE | Facility: HOSPITAL | Age: 22
End: 2017-12-22
Attending: FAMILY MEDICINE
Payer: COMMERCIAL

## 2017-12-22 DIAGNOSIS — M79.10 MYALGIA: Primary | ICD-10-CM

## 2017-12-22 PROCEDURE — 97810 ACUP 1/> WO ESTIM 1ST 15 MIN: CPT | Performed by: ACUPUNCTURIST

## 2017-12-22 PROCEDURE — 97814 ACUP 1/> W/ESTIM EA ADDL 15: CPT | Performed by: ACUPUNCTURIST

## 2017-12-22 NOTE — PROGRESS NOTES
Rosi Feliciano  Integrative Medicine         Katy came today reporting feeling more calm but no much change in the fever. .     Objective: Tongue:  Thin and dry coat,  some speckles slightly yellow coat with red tip and red edges   Pulse: JUAN GORDON and

## 2017-12-22 NOTE — PROGRESS NOTES
Sofia Thakur  Integrative Medicine         Noris Siddiqi came today reporting not too many changes because the stress has been very high.     Objective: Tongue:  Thin and dry coat,  some speckles slightly yellow coat with red tip and red edges   Pulse: JUAN

## 2017-12-22 NOTE — PROGRESS NOTES
Maryellen Velazquez  [facility] Medicine         Katy came today reporting a low grade fever since October with body aches 100.5 or 99.8. She has low energy. She has done many tests with no diagnose.  Recommended an stool test to check for parasites or

## 2017-12-28 ENCOUNTER — OFFICE VISIT (OUTPATIENT)
Dept: INTEGRATIVE MEDICINE | Facility: HOSPITAL | Age: 22
End: 2017-12-28
Attending: FAMILY MEDICINE
Payer: COMMERCIAL

## 2017-12-28 ENCOUNTER — APPOINTMENT (OUTPATIENT)
Dept: LAB | Facility: HOSPITAL | Age: 22
End: 2017-12-28
Attending: FAMILY MEDICINE
Payer: COMMERCIAL

## 2017-12-28 DIAGNOSIS — M79.10 MYALGIA: Primary | ICD-10-CM

## 2017-12-28 DIAGNOSIS — R50.9 INTERMITTENT FUO: ICD-10-CM

## 2017-12-28 PROCEDURE — 87272 CRYPTOSPORIDIUM AG IF: CPT

## 2017-12-28 PROCEDURE — 87338 HPYLORI STOOL AG IA: CPT

## 2017-12-28 PROCEDURE — 87329 GIARDIA AG IA: CPT

## 2017-12-28 PROCEDURE — 97811 ACUP 1/> W/O ESTIM EA ADD 15: CPT | Performed by: ACUPUNCTURIST

## 2017-12-28 PROCEDURE — 97810 ACUP 1/> WO ESTIM 1ST 15 MIN: CPT | Performed by: ACUPUNCTURIST

## 2017-12-28 NOTE — PROGRESS NOTES
Tiara Little  Integrative Medicine         Katy came today more energetic but reporting some high temperature because the stress has been very high.     Objective: Tongue:  Thin and dry coat,  some speckles slightly yellow coat with red tip and r

## 2017-12-29 LAB
CRYPTOSP AG STL QL IA: NEGATIVE
G LAMBLIA AG STL QL IA: NEGATIVE

## 2017-12-30 LAB — HELICOBACTER PYLORI AG, FECAL: NEGATIVE

## 2018-02-06 ENCOUNTER — OFFICE VISIT (OUTPATIENT)
Dept: FAMILY MEDICINE CLINIC | Facility: CLINIC | Age: 23
End: 2018-02-06

## 2018-02-06 VITALS
HEIGHT: 66 IN | TEMPERATURE: 99 F | HEART RATE: 109 BPM | BODY MASS INDEX: 30.31 KG/M2 | DIASTOLIC BLOOD PRESSURE: 87 MMHG | RESPIRATION RATE: 18 BRPM | WEIGHT: 188.63 LBS | SYSTOLIC BLOOD PRESSURE: 134 MMHG

## 2018-02-06 DIAGNOSIS — R50.9 INTERMITTENT FUO: Primary | ICD-10-CM

## 2018-02-06 DIAGNOSIS — F41.8 DEPRESSION WITH ANXIETY: ICD-10-CM

## 2018-02-06 PROCEDURE — 99213 OFFICE O/P EST LOW 20 MIN: CPT | Performed by: FAMILY MEDICINE

## 2018-02-06 PROCEDURE — 99212 OFFICE O/P EST SF 10 MIN: CPT | Performed by: FAMILY MEDICINE

## 2018-02-06 NOTE — PROGRESS NOTES
HPI:    Patient ID: Blessing Rosas is a 25year old female. Fever    This is a recurrent problem. The current episode started more than 1 year ago. The problem has been waxing and waning. The maximum temperature noted was 99 to 99.9 F.  Associated symptom extensive workup without evident cause. See previous visit. She is now returned from [facility]. She is exercising regularly at health club 3 times a week and batting cages. Planning to resume acupuncture sessions.   We also discussed initiating CBT/mindful

## 2018-02-09 ENCOUNTER — APPOINTMENT (OUTPATIENT)
Dept: INTEGRATIVE MEDICINE | Facility: HOSPITAL | Age: 23
End: 2018-02-09
Attending: FAMILY MEDICINE
Payer: COMMERCIAL

## 2018-02-21 PROBLEM — F32.A DEPRESSION: Status: ACTIVE | Noted: 2018-02-21

## 2018-02-21 PROBLEM — F40.10 SOCIAL ANXIETY DISORDER: Status: ACTIVE | Noted: 2018-02-21

## 2018-02-23 ENCOUNTER — OFFICE VISIT (OUTPATIENT)
Dept: INTEGRATIVE MEDICINE | Facility: HOSPITAL | Age: 23
End: 2018-02-23
Attending: FAMILY MEDICINE
Payer: COMMERCIAL

## 2018-02-23 DIAGNOSIS — M79.10 MYALGIA: Primary | ICD-10-CM

## 2018-02-23 PROCEDURE — 97810 ACUP 1/> WO ESTIM 1ST 15 MIN: CPT | Performed by: ACUPUNCTURIST

## 2018-02-23 PROCEDURE — 97811 ACUP 1/> W/O ESTIM EA ADD 15: CPT | Performed by: ACUPUNCTURIST

## 2018-02-24 ENCOUNTER — PATIENT MESSAGE (OUTPATIENT)
Dept: FAMILY MEDICINE CLINIC | Facility: CLINIC | Age: 23
End: 2018-02-24

## 2018-02-24 DIAGNOSIS — R53.81 CHRONIC FATIGUE AND MALAISE: Primary | ICD-10-CM

## 2018-02-24 DIAGNOSIS — R53.82 CHRONIC FATIGUE AND MALAISE: Primary | ICD-10-CM

## 2018-02-27 ENCOUNTER — OFFICE VISIT (OUTPATIENT)
Dept: INTEGRATIVE MEDICINE | Facility: HOSPITAL | Age: 23
End: 2018-02-27
Attending: FAMILY MEDICINE
Payer: COMMERCIAL

## 2018-02-27 DIAGNOSIS — M79.10 MYALGIA: Primary | ICD-10-CM

## 2018-02-27 PROCEDURE — 97810 ACUP 1/> WO ESTIM 1ST 15 MIN: CPT | Performed by: ACUPUNCTURIST

## 2018-02-27 PROCEDURE — 97811 ACUP 1/> W/O ESTIM EA ADD 15: CPT | Performed by: ACUPUNCTURIST

## 2018-02-27 NOTE — TELEPHONE ENCOUNTER
[facility] please advise     From: Laura Anguiano  To:  Justin Costello MD  Sent: 2/24/2018 11:25 AM CST  Subject: Non-Urgent Alejandra Matthews,    Dr. Perry Nageotte was wondering about me doing a hormone test, I guess it would help him with acupunctur

## 2018-02-27 NOTE — TELEPHONE ENCOUNTER
Nurses–please call Dr. Nicole Velázquez office to see if this is something that she offers. We do not do the saliva test.  Let patient know the results of this call, if they offer it, she should make an appointment there.   If not, let patient know I can do a bloo

## 2018-02-27 NOTE — TELEPHONE ENCOUNTER
Spoke to nurse at Dr Oziel Clemens office who confirmed that they do not do saliva tests at [facility].

## 2018-02-28 RX ORDER — NORETHINDRONE ACETATE AND ETHINYL ESTRADIOL AND FERROUS FUMARATE 1.5-30(21)
KIT ORAL
Qty: 280 TABLET | Refills: 3 | Status: SHIPPED | OUTPATIENT
Start: 2018-02-28 | End: 2019-03-08

## 2018-02-28 NOTE — TELEPHONE ENCOUNTER
Routed to Dr Shefali Snyder for advise, thanks. See below RN message. lab order pended, need dx pls.

## 2018-03-01 ENCOUNTER — APPOINTMENT (OUTPATIENT)
Dept: LAB | Age: 23
End: 2018-03-01
Attending: FAMILY MEDICINE
Payer: COMMERCIAL

## 2018-03-01 DIAGNOSIS — R53.82 CHRONIC FATIGUE AND MALAISE: ICD-10-CM

## 2018-03-01 DIAGNOSIS — R53.81 CHRONIC FATIGUE AND MALAISE: ICD-10-CM

## 2018-03-01 LAB
CORTIS SERPL-MCNC: 27.5 MCG/DL
ESTRADIOL SERPL-MCNC: <20 PG/ML
PROGEST SERPL-MCNC: 1 NG/ML

## 2018-03-01 PROCEDURE — 82533 TOTAL CORTISOL: CPT

## 2018-03-01 PROCEDURE — 84144 ASSAY OF PROGESTERONE: CPT

## 2018-03-01 PROCEDURE — 82627 DEHYDROEPIANDROSTERONE: CPT

## 2018-03-01 PROCEDURE — 36415 COLL VENOUS BLD VENIPUNCTURE: CPT

## 2018-03-01 PROCEDURE — 82670 ASSAY OF TOTAL ESTRADIOL: CPT

## 2018-03-02 ENCOUNTER — OFFICE VISIT (OUTPATIENT)
Dept: INTEGRATIVE MEDICINE | Facility: HOSPITAL | Age: 23
End: 2018-03-02
Attending: FAMILY MEDICINE
Payer: COMMERCIAL

## 2018-03-02 DIAGNOSIS — M79.10 MYALGIA: Primary | ICD-10-CM

## 2018-03-02 PROCEDURE — 97811 ACUP 1/> W/O ESTIM EA ADD 15: CPT | Performed by: ACUPUNCTURIST

## 2018-03-02 PROCEDURE — 97810 ACUP 1/> WO ESTIM 1ST 15 MIN: CPT | Performed by: ACUPUNCTURIST

## 2018-03-03 NOTE — PROGRESS NOTES
Rocael Wilson  Integrative Medicine         Katy came today more energetic but reporting some high temperature because the stress has been very high.     Objective: Tongue:  Thin and dry coat,  some speckles slightly yellow coat with red tip and r

## 2018-03-05 NOTE — PROGRESS NOTES
Sanket Riddle  Integrative Medicine         Katy came today more energetic but reporting some high temperature because the stress has been very high.     Objective: Tongue:  Thin and dry coat,  some speckles slightly yellow coat with red tip and r

## 2018-03-06 LAB — DEHYDROEPIANDROSTERONE BY TMS: 6.51 NG/ML

## 2018-03-14 RX ORDER — ESCITALOPRAM OXALATE 10 MG/1
10 TABLET ORAL DAILY
Qty: 90 TABLET | Refills: 0 | Status: SHIPPED | OUTPATIENT
Start: 2018-03-14 | End: 2018-04-10

## 2018-03-15 ENCOUNTER — APPOINTMENT (OUTPATIENT)
Dept: LAB | Age: 23
End: 2018-03-15
Attending: FAMILY MEDICINE
Payer: COMMERCIAL

## 2018-03-15 ENCOUNTER — OFFICE VISIT (OUTPATIENT)
Dept: INTEGRATIVE MEDICINE | Facility: HOSPITAL | Age: 23
End: 2018-03-15
Attending: FAMILY MEDICINE
Payer: COMMERCIAL

## 2018-03-15 DIAGNOSIS — R79.89 ELEVATED CORTISOL LEVEL: ICD-10-CM

## 2018-03-15 DIAGNOSIS — M79.10 MYALGIA: Primary | ICD-10-CM

## 2018-03-15 PROCEDURE — 82530 CORTISOL FREE: CPT

## 2018-03-15 PROCEDURE — 97811 ACUP 1/> W/O ESTIM EA ADD 15: CPT | Performed by: ACUPUNCTURIST

## 2018-03-15 PROCEDURE — 97810 ACUP 1/> WO ESTIM 1ST 15 MIN: CPT | Performed by: ACUPUNCTURIST

## 2018-03-18 LAB
CORTISOL, UR FREE-RATIO TO CRT: 17.18 UG/G CRT
CORTISOL, URINE FREE - PER 24H: 28.9 UG/D
CORTISOL, URINE FREE - PER VOL: 17.7 UG/L
CREATININE, URINE - PER 24H: 1679 MG/D
CREATININE, URINE - PER VOLUME: 103 MG/DL
HOURS COLLECTED: 24 HR
TOTAL VOLUME: 1630 ML

## 2018-03-19 ENCOUNTER — OFFICE VISIT (OUTPATIENT)
Dept: INTEGRATIVE MEDICINE | Facility: HOSPITAL | Age: 23
End: 2018-03-19
Attending: FAMILY MEDICINE
Payer: COMMERCIAL

## 2018-03-19 DIAGNOSIS — M79.10 MYALGIA: Primary | ICD-10-CM

## 2018-03-19 PROCEDURE — 97811 ACUP 1/> W/O ESTIM EA ADD 15: CPT | Performed by: ACUPUNCTURIST

## 2018-03-19 PROCEDURE — 97810 ACUP 1/> WO ESTIM 1ST 15 MIN: CPT | Performed by: ACUPUNCTURIST

## 2018-03-20 NOTE — PROGRESS NOTES
Aneesh Villanueva  Integrative Medicine         Katy came today more energetic but reporting some high temperature because the stress has been very high.     Objective: Tongue:  Thin and dry coat,  some speckles slightly yellow coat with red tip and r

## 2018-03-22 NOTE — PROGRESS NOTES
Lucien Gunn  Integrative Medicine         Katy came today more energetic but reporting some high temperature because the stress has been very high.     Objective: Tongue:  Thin and dry coat,  some speckles slightly yellow coat with red tip and r

## 2018-03-23 ENCOUNTER — APPOINTMENT (OUTPATIENT)
Dept: INTEGRATIVE MEDICINE | Facility: HOSPITAL | Age: 23
End: 2018-03-23
Attending: FAMILY MEDICINE
Payer: COMMERCIAL

## 2018-04-02 ENCOUNTER — OFFICE VISIT (OUTPATIENT)
Dept: INTEGRATIVE MEDICINE | Facility: HOSPITAL | Age: 23
End: 2018-04-02
Attending: FAMILY MEDICINE
Payer: COMMERCIAL

## 2018-04-02 DIAGNOSIS — M79.10 MYALGIA: Primary | ICD-10-CM

## 2018-04-02 PROCEDURE — 97810 ACUP 1/> WO ESTIM 1ST 15 MIN: CPT | Performed by: ACUPUNCTURIST

## 2018-04-02 PROCEDURE — 97811 ACUP 1/> W/O ESTIM EA ADD 15: CPT | Performed by: ACUPUNCTURIST

## 2018-04-02 NOTE — PROGRESS NOTES
Maryellen Velazquez  Integrative Medicine         Katy came today more energetic but reporting some high temperature because the stress has been very high.  The heart rate was 115 and I made her to run in place for 9 seconds and it went up to 138.     Ob

## 2018-04-06 ENCOUNTER — OFFICE VISIT (OUTPATIENT)
Dept: INTEGRATIVE MEDICINE | Facility: HOSPITAL | Age: 23
End: 2018-04-06
Attending: FAMILY MEDICINE
Payer: COMMERCIAL

## 2018-04-06 DIAGNOSIS — M79.10 MYALGIA: Primary | ICD-10-CM

## 2018-04-06 PROCEDURE — 97811 ACUP 1/> W/O ESTIM EA ADD 15: CPT | Performed by: ACUPUNCTURIST

## 2018-04-06 PROCEDURE — 97810 ACUP 1/> WO ESTIM 1ST 15 MIN: CPT | Performed by: ACUPUNCTURIST

## 2018-04-10 ENCOUNTER — OFFICE VISIT (OUTPATIENT)
Dept: FAMILY MEDICINE CLINIC | Facility: CLINIC | Age: 23
End: 2018-04-10

## 2018-04-10 VITALS
HEIGHT: 66 IN | WEIGHT: 191 LBS | BODY MASS INDEX: 30.7 KG/M2 | DIASTOLIC BLOOD PRESSURE: 86 MMHG | HEART RATE: 130 BPM | SYSTOLIC BLOOD PRESSURE: 138 MMHG | RESPIRATION RATE: 18 BRPM | TEMPERATURE: 100 F

## 2018-04-10 DIAGNOSIS — Z01.419 ENCOUNTER FOR GYNECOLOGICAL EXAMINATION WITHOUT ABNORMAL FINDING: Primary | ICD-10-CM

## 2018-04-10 DIAGNOSIS — R53.82 CHRONIC FATIGUE AND MALAISE: ICD-10-CM

## 2018-04-10 DIAGNOSIS — F41.8 DEPRESSION WITH ANXIETY: ICD-10-CM

## 2018-04-10 DIAGNOSIS — R00.0 TACHYCARDIA: ICD-10-CM

## 2018-04-10 DIAGNOSIS — R53.81 CHRONIC FATIGUE AND MALAISE: ICD-10-CM

## 2018-04-10 DIAGNOSIS — E66.9 NON MORBID OBESITY, UNSPECIFIED OBESITY TYPE: ICD-10-CM

## 2018-04-10 PROCEDURE — 99395 PREV VISIT EST AGE 18-39: CPT | Performed by: FAMILY MEDICINE

## 2018-04-10 RX ORDER — ESCITALOPRAM OXALATE 10 MG/1
10 TABLET ORAL DAILY
Qty: 90 TABLET | Refills: 1 | Status: SHIPPED | OUTPATIENT
Start: 2018-04-10 | End: 2018-12-06

## 2018-04-10 NOTE — PROGRESS NOTES
HPI:    Patient ID: Blessing Rosas is a 21year old female. HPI    Review of Systems   Constitutional: Positive for fatigue and fever. Musculoskeletal: Positive for myalgias.             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 She will return for labs as lightheaded after Pap today. Pap smear done. Chronic fatigue and malaise–see previous visits.   Patient continues to have low-grade temperature elev

## 2018-04-11 ENCOUNTER — APPOINTMENT (OUTPATIENT)
Dept: LAB | Age: 23
End: 2018-04-11
Attending: FAMILY MEDICINE
Payer: COMMERCIAL

## 2018-04-11 DIAGNOSIS — R53.82 CHRONIC FATIGUE AND MALAISE: ICD-10-CM

## 2018-04-11 DIAGNOSIS — R53.81 CHRONIC FATIGUE AND MALAISE: ICD-10-CM

## 2018-04-11 DIAGNOSIS — Z01.419 ENCOUNTER FOR GYNECOLOGICAL EXAMINATION WITHOUT ABNORMAL FINDING: ICD-10-CM

## 2018-04-11 PROCEDURE — 36415 COLL VENOUS BLD VENIPUNCTURE: CPT | Performed by: FAMILY MEDICINE

## 2018-04-11 PROCEDURE — 85652 RBC SED RATE AUTOMATED: CPT

## 2018-04-11 PROCEDURE — 80061 LIPID PANEL: CPT

## 2018-04-11 PROCEDURE — 82728 ASSAY OF FERRITIN: CPT

## 2018-04-11 PROCEDURE — 86141 C-REACTIVE PROTEIN HS: CPT

## 2018-04-11 PROCEDURE — 80048 BASIC METABOLIC PNL TOTAL CA: CPT | Performed by: FAMILY MEDICINE

## 2018-04-11 NOTE — PROGRESS NOTES
Kaylynn Fulton  Integrative Medicine         Katy came today more energetic but reporting some cravings for sweets or ice cream especially at night. I recommended not to take any sweet before bed to promote DHEA production. Objective: Tongue:  Can Pino

## 2018-04-13 ENCOUNTER — OFFICE VISIT (OUTPATIENT)
Dept: INTEGRATIVE MEDICINE | Facility: HOSPITAL | Age: 23
End: 2018-04-13
Attending: FAMILY MEDICINE
Payer: COMMERCIAL

## 2018-04-13 DIAGNOSIS — M79.10 MYALGIA: Primary | ICD-10-CM

## 2018-04-13 PROCEDURE — 97810 ACUP 1/> WO ESTIM 1ST 15 MIN: CPT | Performed by: ACUPUNCTURIST

## 2018-04-13 PROCEDURE — 97811 ACUP 1/> W/O ESTIM EA ADD 15: CPT | Performed by: ACUPUNCTURIST

## 2018-04-13 NOTE — PROGRESS NOTES
Lm Bell  Integrative Medicine         Katy came today more energetic but reporting some cravings for sweets or ice cream especially at night. I recommended not to take any sweet before bed to promote DHEA production. Objective: Tongue:  Alesha Santamaria

## 2018-04-20 ENCOUNTER — OFFICE VISIT (OUTPATIENT)
Dept: INTEGRATIVE MEDICINE | Facility: HOSPITAL | Age: 23
End: 2018-04-20
Attending: FAMILY MEDICINE
Payer: COMMERCIAL

## 2018-04-20 DIAGNOSIS — M79.10 MYALGIA: Primary | ICD-10-CM

## 2018-04-20 PROCEDURE — 97811 ACUP 1/> W/O ESTIM EA ADD 15: CPT | Performed by: ACUPUNCTURIST

## 2018-04-20 PROCEDURE — 97810 ACUP 1/> WO ESTIM 1ST 15 MIN: CPT | Performed by: ACUPUNCTURIST

## 2018-04-20 NOTE — PROGRESS NOTES
Elicia Schmidt  Integrative Medicine         Katy came today more energetic but reporting some cravings for sweets or ice cream especially at night. I recommended not to take any sweet before bed to promote DHEA production. Objective: Tongue:  Chris Rodríguez

## 2018-04-24 ENCOUNTER — APPOINTMENT (OUTPATIENT)
Dept: INTEGRATIVE MEDICINE | Facility: HOSPITAL | Age: 23
End: 2018-04-24
Attending: FAMILY MEDICINE
Payer: COMMERCIAL

## 2018-04-27 ENCOUNTER — OFFICE VISIT (OUTPATIENT)
Dept: INTEGRATIVE MEDICINE | Facility: HOSPITAL | Age: 23
End: 2018-04-27
Attending: FAMILY MEDICINE
Payer: COMMERCIAL

## 2018-04-27 DIAGNOSIS — M79.10 MYALGIA: Primary | ICD-10-CM

## 2018-04-27 PROCEDURE — 97811 ACUP 1/> W/O ESTIM EA ADD 15: CPT | Performed by: ACUPUNCTURIST

## 2018-04-27 PROCEDURE — 97810 ACUP 1/> WO ESTIM 1ST 15 MIN: CPT | Performed by: ACUPUNCTURIST

## 2018-04-27 NOTE — PROGRESS NOTES
Rosi Feliciano  Integrative Medicine         Katy came today more energetic . Objective: Tongue:  Thin and dry coat,  some speckles slightly yellow coat with red tip and red edges   Pulse: JUAN SP and KD thin and weak and deep               HT is th

## 2018-05-02 PROBLEM — F32.A DEPRESSION: Status: RESOLVED | Noted: 2018-02-21 | Resolved: 2018-05-02

## 2018-05-18 ENCOUNTER — APPOINTMENT (OUTPATIENT)
Dept: INTEGRATIVE MEDICINE | Facility: HOSPITAL | Age: 23
End: 2018-05-18
Attending: FAMILY MEDICINE
Payer: COMMERCIAL

## 2018-06-01 ENCOUNTER — NURSE TRIAGE (OUTPATIENT)
Dept: OTHER | Age: 23
End: 2018-06-01

## 2018-06-01 ENCOUNTER — PATIENT MESSAGE (OUTPATIENT)
Dept: FAMILY MEDICINE CLINIC | Facility: CLINIC | Age: 23
End: 2018-06-01

## 2018-06-01 DIAGNOSIS — R50.9 FEBRILE ILLNESS: Primary | ICD-10-CM

## 2018-06-01 NOTE — PROGRESS NOTES
HPI:    Patient ID: Laura Anguiano is a 21year old female. HPI    Review of Systems         Current Outpatient Prescriptions:  escitalopram 10 MG Oral Tab Take 1 tablet (10 mg total) by mouth daily.  Disp: 90 tablet Rfl: 1   JUNEL FE 1.5/30 1.5-30 MG-MCG

## 2018-06-01 NOTE — TELEPHONE ENCOUNTER
Note      From: Reina Sunshine  To: Atul Elizondo MD  Sent: 6/1/2018 11:31 AM CDT  Subject: Non-Urgent Nevin Lobe Dr. Sanchez Hasten been having some dizziness the past week and a half and I wanted to see if you had any suggestions.     Since

## 2018-06-01 NOTE — TELEPHONE ENCOUNTER
Action Requested: Summary for Provider     []  Critical Lab, Recommendations Needed  [] Need Additional Advice  []   FYI    []   Need Orders  [] Need Medications Sent to Pharmacy  []  Other     SUMMARY: pt states being treated for ongoing chronic dizziness

## 2018-06-01 NOTE — TELEPHONE ENCOUNTER
From: Victoriano Chan  To: Trinidad Noyola MD  Sent: 6/1/2018 11:31 AM CDT  Subject: Non-Urgent Robert Briseno been having some dizziness the past week and a half and I wanted to see if you had any suggestions.     Since having thi

## 2018-06-01 NOTE — TELEPHONE ENCOUNTER
Patient contacted, after discussing symptoms, recommend consultation with Dr Bandar Aragon. Also will check for West Nile Virus antibody.

## 2018-06-02 ENCOUNTER — APPOINTMENT (OUTPATIENT)
Dept: LAB | Age: 23
End: 2018-06-02
Attending: FAMILY MEDICINE
Payer: COMMERCIAL

## 2018-06-02 DIAGNOSIS — R50.9 FEBRILE ILLNESS: ICD-10-CM

## 2018-06-02 PROCEDURE — 86789 WEST NILE VIRUS ANTIBODY: CPT

## 2018-06-02 PROCEDURE — 36415 COLL VENOUS BLD VENIPUNCTURE: CPT

## 2018-06-02 PROCEDURE — 86788 WEST NILE VIRUS AB IGM: CPT

## 2018-06-12 RX ORDER — ERYTHROMYCIN AND BENZOYL PEROXIDE 30; 50 MG/G; MG/G
GEL TOPICAL
Qty: 46.6 G | Refills: 2 | Status: SHIPPED | OUTPATIENT
Start: 2018-06-12 | End: 2019-01-04

## 2018-06-15 ENCOUNTER — OFFICE VISIT (OUTPATIENT)
Dept: INTEGRATIVE MEDICINE | Facility: HOSPITAL | Age: 23
End: 2018-06-15
Attending: FAMILY MEDICINE
Payer: COMMERCIAL

## 2018-06-15 DIAGNOSIS — M79.10 MYALGIA: Primary | ICD-10-CM

## 2018-06-15 PROCEDURE — 99212 OFFICE O/P EST SF 10 MIN: CPT | Performed by: ACUPUNCTURIST

## 2018-06-15 PROCEDURE — 97810 ACUP 1/> WO ESTIM 1ST 15 MIN: CPT | Performed by: ACUPUNCTURIST

## 2018-06-15 NOTE — PROGRESS NOTES
Sofia Thakur  Integrative Medicine         Katy came today more energetic and feeling more vibrant by 30%. Objective: Tongue:  Thin and dry coat,  some speckles slightly yellow coat with red tip and red edges   Pulse: JUAN SP and KD thin and weak

## 2018-06-22 ENCOUNTER — APPOINTMENT (OUTPATIENT)
Dept: INTEGRATIVE MEDICINE | Facility: HOSPITAL | Age: 23
End: 2018-06-22
Attending: FAMILY MEDICINE
Payer: COMMERCIAL

## 2018-06-29 ENCOUNTER — OFFICE VISIT (OUTPATIENT)
Dept: INTEGRATIVE MEDICINE | Facility: HOSPITAL | Age: 23
End: 2018-06-29
Attending: FAMILY MEDICINE
Payer: COMMERCIAL

## 2018-06-29 DIAGNOSIS — M79.10 MYALGIA: Primary | ICD-10-CM

## 2018-06-29 PROCEDURE — 97811 ACUP 1/> W/O ESTIM EA ADD 15: CPT | Performed by: ACUPUNCTURIST

## 2018-06-29 PROCEDURE — 97810 ACUP 1/> WO ESTIM 1ST 15 MIN: CPT | Performed by: ACUPUNCTURIST

## 2018-06-29 NOTE — PROGRESS NOTES
Tiara Little  Integrative Medicine         Katy came today more energetic and feeling more vibrant by 35%. Objective: Tongue:  Thin and dry coat,  some speckles slightly yellow coat with red tip and red edges   Pulse: JUAN SP and KD thin and weak

## 2018-08-04 ENCOUNTER — NURSE TRIAGE (OUTPATIENT)
Dept: OTHER | Age: 23
End: 2018-08-04

## 2018-08-04 NOTE — TELEPHONE ENCOUNTER
Ice and elevate as much as possible. It is typical for bruising to increase over the first few days of toe injury.   I do not think immediate care necessary as unlikely to change treatment, she could tape the third and fourth toe together anyway to give erickson

## 2018-08-04 NOTE — TELEPHONE ENCOUNTER
Pt contacted and informed of recommendations as stated below by [facility]. Pt verbalized understanding/compliance.

## 2018-08-04 NOTE — TELEPHONE ENCOUNTER
Action Requested: Summary for Provider     []  Critical Lab, Recommendations Needed  [x] Need Additional Advice  []   FYI    []   Need Orders  [] Need Medications Sent to Pharmacy  []  Other     SUMMARY: Pt states she stubbed her right 4th toe Thursday(8/2

## 2018-09-07 ENCOUNTER — OFFICE VISIT (OUTPATIENT)
Dept: INTEGRATIVE MEDICINE | Facility: HOSPITAL | Age: 23
End: 2018-09-07
Attending: FAMILY MEDICINE
Payer: COMMERCIAL

## 2018-09-07 DIAGNOSIS — M79.10 MYALGIA: Primary | ICD-10-CM

## 2018-09-07 PROCEDURE — 97810 ACUP 1/> WO ESTIM 1ST 15 MIN: CPT | Performed by: ACUPUNCTURIST

## 2018-09-07 PROCEDURE — 97811 ACUP 1/> W/O ESTIM EA ADD 15: CPT | Performed by: ACUPUNCTURIST

## 2018-09-14 ENCOUNTER — OFFICE VISIT (OUTPATIENT)
Dept: INTEGRATIVE MEDICINE | Facility: HOSPITAL | Age: 23
End: 2018-09-14
Attending: FAMILY MEDICINE
Payer: COMMERCIAL

## 2018-09-14 DIAGNOSIS — M79.10 MYALGIA: Primary | ICD-10-CM

## 2018-09-14 PROCEDURE — 97810 ACUP 1/> WO ESTIM 1ST 15 MIN: CPT | Performed by: ACUPUNCTURIST

## 2018-09-14 PROCEDURE — 97811 ACUP 1/> W/O ESTIM EA ADD 15: CPT | Performed by: ACUPUNCTURIST

## 2018-09-21 ENCOUNTER — OFFICE VISIT (OUTPATIENT)
Dept: INTEGRATIVE MEDICINE | Facility: HOSPITAL | Age: 23
End: 2018-09-21
Attending: FAMILY MEDICINE
Payer: COMMERCIAL

## 2018-09-21 DIAGNOSIS — M79.10 MYALGIA: Primary | ICD-10-CM

## 2018-09-21 PROCEDURE — 97811 ACUP 1/> W/O ESTIM EA ADD 15: CPT | Performed by: ACUPUNCTURIST

## 2018-09-21 PROCEDURE — 97810 ACUP 1/> WO ESTIM 1ST 15 MIN: CPT | Performed by: ACUPUNCTURIST

## 2018-09-27 NOTE — PROGRESS NOTES
Curt Carter  Integrative Medicine         Katy came today more energetic and feeling more vibrant by 36%. Objective: Tongue:  Thin and dry coat,  some speckles slightly yellow coat with red tip and red edges   Pulse: JUAN SP and KD thin and weak

## 2018-10-11 NOTE — PROGRESS NOTES
Allan Chester  Integrative Medicine         Katy came today more energetic and feeling more vibrant by 39%. Objective: Tongue:  Thin and dry coat,  some speckles slightly yellow coat with red tip and red edges   Pulse: JUAN SP and KD thin and weak

## 2018-10-11 NOTE — PROGRESS NOTES
Sanket Riddle  Integrative Medicine         Katy came today more energetic and feeling more vibrant by 38%. Objective: Tongue:  Thin and dry coat,  some speckles slightly yellow coat with red tip and red edges   Pulse: JUAN SP and KD thin and weak

## 2018-10-12 ENCOUNTER — OFFICE VISIT (OUTPATIENT)
Dept: INTEGRATIVE MEDICINE | Facility: HOSPITAL | Age: 23
End: 2018-10-12
Attending: GENERAL ACUTE CARE HOSPITAL
Payer: COMMERCIAL

## 2018-10-12 DIAGNOSIS — M79.10 MYALGIA: Primary | ICD-10-CM

## 2018-10-12 PROCEDURE — 97810 ACUP 1/> WO ESTIM 1ST 15 MIN: CPT | Performed by: ACUPUNCTURIST

## 2018-10-12 PROCEDURE — 97811 ACUP 1/> W/O ESTIM EA ADD 15: CPT | Performed by: ACUPUNCTURIST

## 2018-10-19 ENCOUNTER — OFFICE VISIT (OUTPATIENT)
Dept: INTEGRATIVE MEDICINE | Facility: CLINIC | Age: 23
End: 2018-10-19
Payer: COMMERCIAL

## 2018-10-19 DIAGNOSIS — M79.10 MYALGIA: Primary | ICD-10-CM

## 2018-10-19 PROCEDURE — 97810 ACUP 1/> WO ESTIM 1ST 15 MIN: CPT | Performed by: ACUPUNCTURIST

## 2018-10-19 PROCEDURE — 97811 ACUP 1/> W/O ESTIM EA ADD 15: CPT | Performed by: ACUPUNCTURIST

## 2018-10-19 NOTE — PROGRESS NOTES
Mine Rebollar  Integrative Medicine         Katy came today feeling more fatigued. Objective: Tongue:  Thin and dry coat,  some speckles slightly yellow coat with red tip and red edges   Pulse: JUAN SP and KD thin and weak and deep               HT

## 2018-11-13 NOTE — PROGRESS NOTES
Newt Mail  Integrative Medicine         Katy came today more energetic and feeling more vibrant by 40%. Objective: Tongue:  Thin and dry coat,  some speckles slightly yellow coat with red tip and red edges   Pulse: UJAN SP and KD thin and weak

## 2018-11-15 RX ORDER — DOXYCYCLINE HYCLATE 50 MG/1
CAPSULE ORAL
Qty: 30 CAPSULE | Refills: 2 | Status: SHIPPED | OUTPATIENT
Start: 2018-11-15 | End: 2019-01-04

## 2018-11-20 DIAGNOSIS — M79.10 MYALGIA: Primary | ICD-10-CM

## 2018-12-01 ENCOUNTER — OFFICE VISIT (OUTPATIENT)
Dept: FAMILY MEDICINE CLINIC | Facility: CLINIC | Age: 23
End: 2018-12-01
Payer: COMMERCIAL

## 2018-12-01 VITALS
RESPIRATION RATE: 20 BRPM | WEIGHT: 186.63 LBS | DIASTOLIC BLOOD PRESSURE: 80 MMHG | TEMPERATURE: 99 F | BODY MASS INDEX: 29.99 KG/M2 | HEIGHT: 66 IN | HEART RATE: 118 BPM | SYSTOLIC BLOOD PRESSURE: 120 MMHG

## 2018-12-01 DIAGNOSIS — R53.82 CHRONIC FATIGUE SYNDROME: Primary | ICD-10-CM

## 2018-12-01 DIAGNOSIS — R50.9 FEVER OF UNKNOWN ORIGIN: ICD-10-CM

## 2018-12-01 PROCEDURE — 99213 OFFICE O/P EST LOW 20 MIN: CPT | Performed by: FAMILY MEDICINE

## 2018-12-01 PROCEDURE — 99212 OFFICE O/P EST SF 10 MIN: CPT | Performed by: FAMILY MEDICINE

## 2018-12-01 PROCEDURE — 90686 IIV4 VACC NO PRSV 0.5 ML IM: CPT | Performed by: FAMILY MEDICINE

## 2018-12-01 PROCEDURE — 90471 IMMUNIZATION ADMIN: CPT | Performed by: FAMILY MEDICINE

## 2018-12-01 NOTE — PROGRESS NOTES
HPI:    Patient ID: Jacobo Cummins is a 21year old female. Malaise   This is a chronic problem. The current episode started more than 1 month ago. The problem occurs daily. The problem has been waxing and waning.  Associated symptoms include fatigue and intractable. Daytime fatigue and somnolence. Diffuse myalgias especially lower extremities. Discussed diagnosis of chronic fatigue syndrome and toxic encephalopathy. Continuing with acupuncture, healthy diet, regular low-grade exercise.   Discussed poss

## 2018-12-06 RX ORDER — ESCITALOPRAM OXALATE 10 MG/1
10 TABLET ORAL DAILY
Qty: 90 TABLET | Refills: 0 | Status: SHIPPED | OUTPATIENT
Start: 2018-12-06 | End: 2019-03-08

## 2018-12-07 ENCOUNTER — OFFICE VISIT (OUTPATIENT)
Dept: INTEGRATIVE MEDICINE | Facility: CLINIC | Age: 23
End: 2018-12-07
Payer: COMMERCIAL

## 2018-12-07 DIAGNOSIS — F40.10 SOCIAL ANXIETY DISORDER: ICD-10-CM

## 2018-12-07 PROCEDURE — 97811 ACUP 1/> W/O ESTIM EA ADD 15: CPT | Performed by: ACUPUNCTURIST

## 2018-12-07 PROCEDURE — 97810 ACUP 1/> WO ESTIM 1ST 15 MIN: CPT | Performed by: ACUPUNCTURIST

## 2018-12-13 ENCOUNTER — OFFICE VISIT (OUTPATIENT)
Dept: INTEGRATIVE MEDICINE | Facility: CLINIC | Age: 23
End: 2018-12-13
Payer: COMMERCIAL

## 2018-12-13 DIAGNOSIS — M79.10 MYALGIA: ICD-10-CM

## 2018-12-13 PROCEDURE — 97811 ACUP 1/> W/O ESTIM EA ADD 15: CPT | Performed by: ACUPUNCTURIST

## 2018-12-13 PROCEDURE — 97810 ACUP 1/> WO ESTIM 1ST 15 MIN: CPT | Performed by: ACUPUNCTURIST

## 2018-12-14 NOTE — PROGRESS NOTES
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 Objective: Tongue:  Thin and dry coat,  some speckles slightly yellow coat with red tip and red edges   Pulse: JUAN SP and KD thin

## 2018-12-17 NOTE — PROGRESS NOTES
Kaylynn Fulton  Integrative Medicine         Katy came today feeling 20% less fatigued than las treatment. .  Objective: Tongue:  Thin and dry coat,  some speckles slightly yellow coat with red tip and red edges   Pulse: JUAN SP and KD thin and weak a

## 2018-12-21 ENCOUNTER — OFFICE VISIT (OUTPATIENT)
Dept: INTEGRATIVE MEDICINE | Facility: CLINIC | Age: 23
End: 2018-12-21
Payer: COMMERCIAL

## 2018-12-21 DIAGNOSIS — R50.9 FEVER OF UNKNOWN ORIGIN: ICD-10-CM

## 2018-12-21 PROCEDURE — 97810 ACUP 1/> WO ESTIM 1ST 15 MIN: CPT | Performed by: ACUPUNCTURIST

## 2018-12-21 PROCEDURE — 97811 ACUP 1/> W/O ESTIM EA ADD 15: CPT | Performed by: ACUPUNCTURIST

## 2018-12-21 NOTE — PROGRESS NOTES
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 Objective: Tongue:  Thin and dry coat,  some speckles slightly yellow coat with red tip and red edges   Pulse: JUAN GORDON an

## 2019-01-01 ENCOUNTER — OFFICE VISIT (OUTPATIENT)
Dept: FAMILY MEDICINE CLINIC | Facility: CLINIC | Age: 24
End: 2019-01-01
Payer: COMMERCIAL

## 2019-01-01 VITALS — TEMPERATURE: 99 F

## 2019-01-01 DIAGNOSIS — J02.9 ACUTE PHARYNGITIS, UNSPECIFIED ETIOLOGY: Primary | ICD-10-CM

## 2019-01-01 LAB
CONTROL LINE PRESENT WITH A CLEAR BACKGROUND (YES/NO): YES YES/NO
STREP GRP A CUL-SCR: NEGATIVE

## 2019-01-01 PROCEDURE — 87880 STREP A ASSAY W/OPTIC: CPT | Performed by: NURSE PRACTITIONER

## 2019-01-01 PROCEDURE — 87081 CULTURE SCREEN ONLY: CPT | Performed by: NURSE PRACTITIONER

## 2019-01-01 PROCEDURE — 99202 OFFICE O/P NEW SF 15 MIN: CPT | Performed by: NURSE PRACTITIONER

## 2019-01-01 NOTE — PROGRESS NOTES
CHIEF COMPLAINT:   Patient presents with:  Sore Throat        HPI:   Jayla Morgan is a 21year old female presents to clinic with complaint of sore throat. Sore throat 6-7 days, mild nasal/rhinorrhea yesterday.  She admits to low grade fever and aches (c auscultation bilaterally, no wheezes or rhonchi. Breathing is non labored. CARDIO: RRR without murmur  EXTREMITIES: no cyanosis, clubbing or edema  LYMPH: no anterior cervical. no submandibular lymphadenopathy.   No posterior cervical or occipital lymphade

## 2019-01-01 NOTE — PATIENT INSTRUCTIONS
When You Have a Sore Throat    A sore throat can be painful. There are many reasons why you may have a sore throat. Your healthcare provider will work with you to find the cause of your sore throat. He or she will also find the best treatment for you.   Xochilt Lopez During the exam, your healthcare provider checks your ears, nose, and throat for problems.  He or she also checks for swelling in the neck, and may listen to your chest.  Possible tests  Other tests your healthcare provider may perform include:  · A throat If your sore throat is due to a bacterial infection, antibiotics may speed healing and prevent complications.  Although group A streptococcus (\"strep throat\" or GAS) is the major treatable infection for a sore throat, GAS causes only 5% to 15% of sore thr © 5537-0924 [employer] [address]. [address] [facility] [place]. All rights reserved. This information is not intended as a substitute for professional medical care. Always follow your healthcare professional's instructions.

## 2019-01-04 ENCOUNTER — OFFICE VISIT (OUTPATIENT)
Dept: DERMATOLOGY CLINIC | Facility: CLINIC | Age: 24
End: 2019-01-04
Payer: COMMERCIAL

## 2019-01-04 DIAGNOSIS — L70.0 ACNE VULGARIS: Primary | ICD-10-CM

## 2019-01-04 PROCEDURE — 99212 OFFICE O/P EST SF 10 MIN: CPT | Performed by: DERMATOLOGY

## 2019-01-04 PROCEDURE — 99213 OFFICE O/P EST LOW 20 MIN: CPT | Performed by: DERMATOLOGY

## 2019-01-04 RX ORDER — CLINDAMYCIN PHOSPHATE 10 MG/G
1 AEROSOL, FOAM TOPICAL DAILY
Qty: 100 G | Refills: 6 | Status: SHIPPED | OUTPATIENT
Start: 2019-01-04 | End: 2019-02-03

## 2019-01-04 RX ORDER — DOXYCYCLINE HYCLATE 50 MG/1
CAPSULE ORAL
Qty: 30 CAPSULE | Refills: 5 | Status: SHIPPED | OUTPATIENT
Start: 2019-01-04 | End: 2019-08-03

## 2019-01-04 RX ORDER — ERYTHROMYCIN AND BENZOYL PEROXIDE 30; 50 MG/G; MG/G
GEL TOPICAL
Qty: 50 G | Refills: 3 | Status: SHIPPED | OUTPATIENT
Start: 2019-01-04 | End: 2020-02-11 | Stop reason: ALTCHOICE

## 2019-01-11 ENCOUNTER — OFFICE VISIT (OUTPATIENT)
Dept: INTEGRATIVE MEDICINE | Facility: CLINIC | Age: 24
End: 2019-01-11
Payer: COMMERCIAL

## 2019-01-11 DIAGNOSIS — R50.9 FEVER OF UNKNOWN ORIGIN: ICD-10-CM

## 2019-01-11 PROCEDURE — 97810 ACUP 1/> WO ESTIM 1ST 15 MIN: CPT | Performed by: ACUPUNCTURIST

## 2019-01-11 PROCEDURE — 97811 ACUP 1/> W/O ESTIM EA ADD 15: CPT | Performed by: ACUPUNCTURIST

## 2019-01-14 NOTE — PROGRESS NOTES
Laura Anguiano is a 21year old female. Patient presents with:  Acne: LOV 11/13/2017 Patient pesent to f/u/ Patient currently using doxycycline and Benzamycin with some improvment            Patient has no known allergies.     Current Outpatient Medicatio Spouse name: Not on file      Number of children: Not on file      Years of education: Not on file      Highest education level: Not on file    Social Needs      Financial resource strain: Not on file      Food insecurity - worry: Not on file      Food ins normocephalic  NECK: supple,no adenopathy,  EXTREMITIES: no cyanosis, clubbing or edema    SKIN:  multiple inflammatory papules,   nodules  , prominent atrophic erythematous macules   , prominent postinflammatory erythema  , hyperpigmentation  Over  Cheeks encounter. Results From Past 48 Hours:  No results found for this or any previous visit (from the past 48 hour(s)). Meds This Visit:      Imaging Orders:  None     Referral Orders:  No orders of the defined types were placed in this encounter.

## 2019-01-17 NOTE — PROGRESS NOTES
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 Objective: Tongue:  Thin and dry coat,  some speckles slightly yellow coat with red ti

## 2019-03-08 RX ORDER — ESCITALOPRAM OXALATE 10 MG/1
10 TABLET ORAL DAILY
Qty: 90 TABLET | Refills: 0 | Status: SHIPPED | OUTPATIENT
Start: 2019-03-08 | End: 2019-06-03

## 2019-03-09 RX ORDER — NORETHINDRONE ACETATE AND ETHINYL ESTRADIOL AND FERROUS FUMARATE 1.5-30(21)
KIT ORAL
Qty: 280 TABLET | Refills: 0 | Status: SHIPPED | OUTPATIENT
Start: 2019-03-09 | End: 2020-03-12

## 2019-04-18 ENCOUNTER — OFFICE VISIT (OUTPATIENT)
Dept: FAMILY MEDICINE CLINIC | Facility: CLINIC | Age: 24
End: 2019-04-18
Payer: COMMERCIAL

## 2019-04-18 VITALS
DIASTOLIC BLOOD PRESSURE: 84 MMHG | WEIGHT: 185.5 LBS | TEMPERATURE: 97 F | BODY MASS INDEX: 29.81 KG/M2 | RESPIRATION RATE: 18 BRPM | SYSTOLIC BLOOD PRESSURE: 132 MMHG | HEIGHT: 66 IN | HEART RATE: 88 BPM

## 2019-04-18 DIAGNOSIS — R53.81 CHRONIC FATIGUE AND MALAISE: ICD-10-CM

## 2019-04-18 DIAGNOSIS — Z00.00 ROUTINE PHYSICAL EXAMINATION: Primary | ICD-10-CM

## 2019-04-18 DIAGNOSIS — R53.82 CHRONIC FATIGUE AND MALAISE: ICD-10-CM

## 2019-04-18 DIAGNOSIS — R00.0 TACHYCARDIA: ICD-10-CM

## 2019-04-18 PROBLEM — R50.9 FEVER OF UNKNOWN ORIGIN: Status: RESOLVED | Noted: 2017-01-31 | Resolved: 2019-04-18

## 2019-04-18 PROCEDURE — 99395 PREV VISIT EST AGE 18-39: CPT | Performed by: FAMILY MEDICINE

## 2019-04-18 NOTE — PROGRESS NOTES
HPI:    Patient ID: Ed Siddiqi is a 25year old female. Pain   Associated symptoms include fatigue and headaches. Review of Systems   Constitutional: Positive for fatigue. Respiratory: Negative. Cardiovascular: Negative.     Eleazar Blunt table test at [address]. Did not meet strict criteria for POTS but strongly suspect this diagnosis. She had near syncope with Pap smear last year although generally does not have hypotension.   Since symptoms are improving, no further workup or treatmen

## 2019-05-14 ENCOUNTER — HOSPITAL ENCOUNTER (OUTPATIENT)
Age: 24
Discharge: HOME | End: 2019-05-14
Attending: FAMILY MEDICINE
Payer: COMMERCIAL

## 2019-05-14 ENCOUNTER — TELEPHONE (OUTPATIENT)
Dept: OTHER | Age: 24
End: 2019-05-14

## 2019-05-14 ENCOUNTER — APPOINTMENT (OUTPATIENT)
Dept: GENERAL RADIOLOGY | Age: 24
End: 2019-05-14
Attending: FAMILY MEDICINE
Payer: COMMERCIAL

## 2019-05-14 VITALS
RESPIRATION RATE: 18 BRPM | BODY MASS INDEX: 28.93 KG/M2 | HEIGHT: 66 IN | DIASTOLIC BLOOD PRESSURE: 89 MMHG | OXYGEN SATURATION: 100 % | TEMPERATURE: 98 F | SYSTOLIC BLOOD PRESSURE: 147 MMHG | WEIGHT: 180 LBS | HEART RATE: 97 BPM

## 2019-05-14 DIAGNOSIS — S50.01XA CONTUSION OF RIGHT ELBOW, INITIAL ENCOUNTER: Primary | ICD-10-CM

## 2019-05-14 PROCEDURE — 99213 OFFICE O/P EST LOW 20 MIN: CPT

## 2019-05-14 PROCEDURE — 73080 X-RAY EXAM OF ELBOW: CPT | Performed by: FAMILY MEDICINE

## 2019-05-14 NOTE — ED NOTES
All orders complete pt leaving IC stable no acute distress noted, verbalizes DC and follow up instructions

## 2019-05-14 NOTE — TELEPHONE ENCOUNTER
Action Requested: Summary for Provider     []  Critical Lab, Recommendations Needed  [] Need Additional Advice  []   FYI    []   Need Orders  [] Need Medications Sent to Pharmacy  []  Other     SUMMARY: Pt going to UC for further evaluation right elbow.

## 2019-05-14 NOTE — ED PROVIDER NOTES
[address]    History   Patient presents with:  Upper Extremity Injury (musculoskeletal)    Stated Complaint: rt arm pain    HPI  17yo F with a PMHx sig for depression and migraines presents to IC with 1 swelling (diffuse posterior and lateral aspect). She exhibits normal range of motion (full ROM without pain), no effusion, no deformity and no laceration. Tenderness found. Radial head and lateral epicondyle tenderness noted.  No medial epicondyle and no ol of discharge.           Disposition and Plan     Clinical Impression:  Contusion of right elbow, initial encounter  (primary encounter diagnosis)    Disposition:  Discharge  5/14/2019 11:25 am    Follow-up:  Netta Ugalde MD  [address] [facility]

## 2019-05-14 NOTE — ED INITIAL ASSESSMENT (HPI)
Per pt hit with softball one week ago to right elbow area. Pt states line drive to arm pain swelling and bruising noted. Pt states swelling and bruising have improved but noticed a \" bump\" to area.

## 2019-06-03 RX ORDER — ESCITALOPRAM OXALATE 10 MG/1
TABLET ORAL
Qty: 90 TABLET | Refills: 1 | Status: SHIPPED | OUTPATIENT
Start: 2019-06-03 | End: 2019-11-21

## 2019-08-03 RX ORDER — DOXYCYCLINE HYCLATE 50 MG/1
CAPSULE ORAL
Qty: 30 CAPSULE | Refills: 5 | Status: SHIPPED | OUTPATIENT
Start: 2019-08-03 | End: 2020-02-11 | Stop reason: ALTCHOICE

## 2019-08-03 NOTE — TELEPHONE ENCOUNTER
LOV 01/04/19 pt requesting refill for DOXYCYCLINE HYCLATE 50 MG Oral Cap no appt scheduled please advise thank you LIFESCAPE

## 2019-10-30 ENCOUNTER — NURSE TRIAGE (OUTPATIENT)
Dept: OTHER | Age: 24
End: 2019-10-30

## 2019-10-30 NOTE — TELEPHONE ENCOUNTER
Action Requested: Summary for Provider     []  Critical Lab, Recommendations Needed  [] Need Additional Advice  [x]   FYI    []   Need Orders  [] Need Medications Sent to Pharmacy  []  Other     SUMMARY: OV scheduled for tomorrow 10/31/19 at 10:15am with PHANI

## 2019-10-31 ENCOUNTER — OFFICE VISIT (OUTPATIENT)
Dept: FAMILY MEDICINE CLINIC | Facility: CLINIC | Age: 24
End: 2019-10-31
Payer: COMMERCIAL

## 2019-10-31 VITALS
DIASTOLIC BLOOD PRESSURE: 82 MMHG | WEIGHT: 188.19 LBS | RESPIRATION RATE: 18 BRPM | TEMPERATURE: 99 F | SYSTOLIC BLOOD PRESSURE: 120 MMHG | BODY MASS INDEX: 30 KG/M2 | HEART RATE: 105 BPM

## 2019-10-31 DIAGNOSIS — S46.001A INJURY OF RIGHT ROTATOR CUFF, INITIAL ENCOUNTER: Primary | ICD-10-CM

## 2019-10-31 PROCEDURE — 99213 OFFICE O/P EST LOW 20 MIN: CPT | Performed by: FAMILY MEDICINE

## 2019-10-31 PROCEDURE — 90471 IMMUNIZATION ADMIN: CPT | Performed by: FAMILY MEDICINE

## 2019-10-31 PROCEDURE — 90686 IIV4 VACC NO PRSV 0.5 ML IM: CPT | Performed by: FAMILY MEDICINE

## 2019-10-31 NOTE — PROGRESS NOTES
HPI:    Patient ID: Billy Real is a 25year old female. Shoulder Pain    The pain is present in the right shoulder. This is a new problem. The current episode started in the past 7 days. There has been no history of extremity trauma.  The problem occu ordered in this encounter       Imaging & Referrals:  PHYSICAL THERAPY EXTERNAL  FLULAVAL INFLUENZA VACCINE QUAD PRESERVATIVE FREE 0.5 ML       QO#[id number]

## 2019-11-21 RX ORDER — ESCITALOPRAM OXALATE 10 MG/1
TABLET ORAL
Qty: 90 TABLET | Refills: 1 | Status: SHIPPED | OUTPATIENT
Start: 2019-11-21 | End: 2020-06-14

## 2019-11-21 NOTE — TELEPHONE ENCOUNTER
Refill Protocol Appointment Criteria Refill passed per [facility] protocol.     · Appointment scheduled in the past 6 months or in the next 3 months  Recent Outpatient Visits            3 weeks ago Injury of right rotator cuff, initial encounter    [facility]

## 2020-02-11 ENCOUNTER — OFFICE VISIT (OUTPATIENT)
Dept: FAMILY MEDICINE CLINIC | Facility: CLINIC | Age: 25
End: 2020-02-11
Payer: MEDICAID

## 2020-02-11 ENCOUNTER — NURSE TRIAGE (OUTPATIENT)
Dept: FAMILY MEDICINE CLINIC | Facility: CLINIC | Age: 25
End: 2020-02-11

## 2020-02-11 VITALS
DIASTOLIC BLOOD PRESSURE: 87 MMHG | BODY MASS INDEX: 31 KG/M2 | TEMPERATURE: 99 F | SYSTOLIC BLOOD PRESSURE: 138 MMHG | WEIGHT: 194.63 LBS | RESPIRATION RATE: 18 BRPM | HEART RATE: 96 BPM

## 2020-02-11 DIAGNOSIS — H81.12 BENIGN PAROXYSMAL POSITIONAL VERTIGO OF LEFT EAR: Primary | ICD-10-CM

## 2020-02-11 PROCEDURE — 99213 OFFICE O/P EST LOW 20 MIN: CPT | Performed by: FAMILY MEDICINE

## 2020-02-11 RX ORDER — MECLIZINE HYDROCHLORIDE 25 MG/1
25 TABLET ORAL 3 TIMES DAILY PRN
Qty: 15 TABLET | Refills: 0 | Status: SHIPPED | OUTPATIENT
Start: 2020-02-11 | End: 2020-10-08 | Stop reason: ALTCHOICE

## 2020-02-11 NOTE — TELEPHONE ENCOUNTER
Spoke with patient and relayed Dr. Marcela Ho message below--patient verbalizes understanding and agreement. Appt made for today at 3:30 p.m. with Dr. Michael Zuluaga confirms her father will drive her to appt today.  No further questions/concerns at this rahel

## 2020-02-11 NOTE — TELEPHONE ENCOUNTER
Action Requested: Summary for Provider     []  Critical Lab, Recommendations Needed  [x] Need Additional Advice  []   FYI    []   Need Orders  [] Need Medications Sent to Pharmacy  []  Other     SUMMARY: Patient requesting appt with Dr. Edmar Aguilar today for [facility]

## 2020-02-11 NOTE — PROGRESS NOTES
HPI:    Patient ID: Melissa Puente is a 25year old female. Dizziness   This is a new problem. The current episode started today. The problem occurs intermittently. The problem has been waxing and waning.  Pertinent negatives include no headaches, nausea, orders of the defined types were placed in this encounter.       Meds This Visit:  Requested Prescriptions     Signed Prescriptions Disp Refills   • Meclizine HCl 25 MG Oral Tab 15 tablet 0     Sig: Take 1 tablet (25 mg total) by mouth 3 (three) times daily

## 2020-02-17 ENCOUNTER — TELEPHONE (OUTPATIENT)
Dept: PHYSICAL THERAPY | Facility: HOSPITAL | Age: 25
End: 2020-02-17

## 2020-03-13 RX ORDER — NORETHINDRONE ACETATE AND ETHINYL ESTRADIOL 1.5-30(21)
1 KIT ORAL
Qty: 3 PACKAGE | Refills: 3 | Status: SHIPPED | OUTPATIENT
Start: 2020-03-13 | End: 2020-10-08

## 2020-05-29 ENCOUNTER — TELEPHONE (OUTPATIENT)
Dept: FAMILY MEDICINE CLINIC | Facility: CLINIC | Age: 25
End: 2020-05-29

## 2020-05-29 RX ORDER — DIAZEPAM 5 MG/1
5 TABLET ORAL NIGHTLY PRN
Qty: 20 TABLET | Refills: 0 | Status: SHIPPED | OUTPATIENT
Start: 2020-05-29 | End: 2020-10-08 | Stop reason: ALTCHOICE

## 2020-05-29 NOTE — TELEPHONE ENCOUNTER
Patient was called, advised of Dr. Theodore Thompson note.  Patient verbalized understanding and agreed to plan of care

## 2020-05-29 NOTE — TELEPHONE ENCOUNTER
Please let her know I have sent Rx for Valium to pharmacy. Can use at bedtime as needed. This is an effective muscle relaxant but should not be used for more than 3 weeks as it can be habit-forming.   We generally recommend also using Aleve 2 tablets twic

## 2020-05-29 NOTE — TELEPHONE ENCOUNTER
Spoke with pt,  verified, pt stated having trouble with her jaw, pt has TMJ and she wear mouth guard at [facility]. Lately it's been worse due to stress, hard to eat and hurts to chew.    Pt been icing/heat, she takes advil as needed, is there anything else sh

## 2020-06-14 RX ORDER — ESCITALOPRAM OXALATE 10 MG/1
TABLET ORAL
Qty: 90 TABLET | Refills: 1 | Status: SHIPPED | OUTPATIENT
Start: 2020-06-14 | End: 2020-12-14

## 2020-10-08 ENCOUNTER — LAB ENCOUNTER (OUTPATIENT)
Dept: LAB | Age: 25
End: 2020-10-08
Attending: FAMILY MEDICINE
Payer: MEDICAID

## 2020-10-08 ENCOUNTER — OFFICE VISIT (OUTPATIENT)
Dept: FAMILY MEDICINE CLINIC | Facility: CLINIC | Age: 25
End: 2020-10-08
Payer: MEDICAID

## 2020-10-08 VITALS
RESPIRATION RATE: 18 BRPM | SYSTOLIC BLOOD PRESSURE: 123 MMHG | TEMPERATURE: 97 F | HEART RATE: 102 BPM | DIASTOLIC BLOOD PRESSURE: 84 MMHG | BODY MASS INDEX: 29.51 KG/M2 | WEIGHT: 183.63 LBS | HEIGHT: 66 IN

## 2020-10-08 DIAGNOSIS — Z00.00 ROUTINE PHYSICAL EXAMINATION: ICD-10-CM

## 2020-10-08 DIAGNOSIS — M22.2X2 PATELLOFEMORAL SYNDROME OF BOTH KNEES: ICD-10-CM

## 2020-10-08 DIAGNOSIS — M22.2X1 PATELLOFEMORAL SYNDROME OF BOTH KNEES: ICD-10-CM

## 2020-10-08 DIAGNOSIS — R53.82 CHRONIC FATIGUE SYNDROME: ICD-10-CM

## 2020-10-08 DIAGNOSIS — Z00.00 ROUTINE PHYSICAL EXAMINATION: Primary | ICD-10-CM

## 2020-10-08 DIAGNOSIS — L70.0 ACNE VULGARIS: ICD-10-CM

## 2020-10-08 PROCEDURE — 99395 PREV VISIT EST AGE 18-39: CPT | Performed by: FAMILY MEDICINE

## 2020-10-08 PROCEDURE — 84443 ASSAY THYROID STIM HORMONE: CPT

## 2020-10-08 PROCEDURE — 80061 LIPID PANEL: CPT

## 2020-10-08 PROCEDURE — 80048 BASIC METABOLIC PNL TOTAL CA: CPT

## 2020-10-08 PROCEDURE — 90686 IIV4 VACC NO PRSV 0.5 ML IM: CPT | Performed by: FAMILY MEDICINE

## 2020-10-08 PROCEDURE — 36415 COLL VENOUS BLD VENIPUNCTURE: CPT

## 2020-10-08 PROCEDURE — 90471 IMMUNIZATION ADMIN: CPT | Performed by: FAMILY MEDICINE

## 2020-10-08 PROCEDURE — 3074F SYST BP LT 130 MM HG: CPT | Performed by: FAMILY MEDICINE

## 2020-10-08 PROCEDURE — 3079F DIAST BP 80-89 MM HG: CPT | Performed by: FAMILY MEDICINE

## 2020-10-08 PROCEDURE — 85027 COMPLETE CBC AUTOMATED: CPT

## 2020-10-08 PROCEDURE — 3008F BODY MASS INDEX DOCD: CPT | Performed by: FAMILY MEDICINE

## 2020-10-08 RX ORDER — SPIRONOLACTONE 100 MG/1
100 TABLET, FILM COATED ORAL DAILY
Qty: 90 TABLET | Refills: 3 | Status: SHIPPED | OUTPATIENT
Start: 2020-10-08 | End: 2022-01-15

## 2020-10-08 RX ORDER — LEVONORGESTREL AND ETHINYL ESTRADIOL 100-20(84)
1 KIT ORAL DAILY
Qty: 1 PACKAGE | Refills: 3 | Status: SHIPPED | OUTPATIENT
Start: 2020-10-08 | End: 2021-01-07

## 2020-10-08 RX ORDER — SPIRONOLACTONE 100 MG/1
100 TABLET, FILM COATED ORAL DAILY
COMMUNITY
End: 2020-10-08

## 2020-10-08 NOTE — PROGRESS NOTES
HPI:    Patient ID: Drake Trevino is a 22year old female. HPI    Review of Systems   Constitutional: Negative. Respiratory: Negative. Cardiovascular:        See below   Gastrointestinal: Negative. Skin: Negative.              Current Outpatient Prescriptions     Signed Prescriptions Disp Refills   • spironolactone 100 MG Oral Tab 90 tablet 3     Sig: Take 1 tablet (100 mg total) by mouth daily.    • Levonorgest-Eth Estrad 91-Day (CAMRESE LO) 0.1-0.02 & 0.01 MG Oral Tab 1 Package 3     Sig: Take 1

## 2020-10-18 PROBLEM — G43.109 MIGRAINE WITH AURA: Status: ACTIVE | Noted: 2020-10-18

## 2020-12-04 ENCOUNTER — NURSE TRIAGE (OUTPATIENT)
Dept: FAMILY MEDICINE CLINIC | Facility: CLINIC | Age: 25
End: 2020-12-04

## 2020-12-04 ENCOUNTER — PATIENT MESSAGE (OUTPATIENT)
Dept: FAMILY MEDICINE CLINIC | Facility: CLINIC | Age: 25
End: 2020-12-04

## 2020-12-04 NOTE — TELEPHONE ENCOUNTER
Action Requested: Summary for Provider     []  Critical Lab, Recommendations Needed  [x] Need Additional Advice  []   FYI    []   Need Orders  [] Need Medications Sent to Pharmacy  []  Other     SUMMARY: Patient calling with complaint of breakthrough vagin

## 2020-12-04 NOTE — TELEPHONE ENCOUNTER
Please reassure her that this is very common, and very annoying, with the first pack of extended cycle pills. But if she can keep taking them every day it generally goes away by the second pack.   But if she cannot tolerate, we could go back to previous OC

## 2020-12-04 NOTE — TELEPHONE ENCOUNTER
Please call patient and triage regarding MyChart message copied here. [employer] message response sent in original MyChart encounter, per department process.   ----- Message from Michelet Roger sent at 12/4/2020 12:13 PM CST -----  Regarding: Non-Urgent Medica

## 2020-12-04 NOTE — TELEPHONE ENCOUNTER
From: Keisha Hudson  To: Lidia Jeffries MD  Sent: 12/4/2020 12:13 PM CST  Subject: Non-Urgent Medical Question    Last time I saw Dr. Jennifer Jeffires we switched my birth control to the 3 month cycle one, Levonor/Eth Estradiol tablets.  I've been taking them for a

## 2020-12-14 RX ORDER — ESCITALOPRAM OXALATE 10 MG/1
10 TABLET ORAL DAILY
Qty: 90 TABLET | Refills: 1 | Status: SHIPPED | OUTPATIENT
Start: 2020-12-14 | End: 2021-06-07

## 2021-06-07 RX ORDER — ESCITALOPRAM OXALATE 10 MG/1
10 TABLET ORAL DAILY
Qty: 90 TABLET | Refills: 1 | Status: SHIPPED | OUTPATIENT
Start: 2021-06-07 | End: 2021-12-17

## 2021-06-07 NOTE — TELEPHONE ENCOUNTER
•  escitalopram 10 MG Oral Tab, Take 1 tablet (10 mg total) by mouth daily. , Disp: 90 tablet, Rfl: 1

## 2021-07-15 ENCOUNTER — TELEPHONE (OUTPATIENT)
Dept: FAMILY MEDICINE CLINIC | Facility: CLINIC | Age: 26
End: 2021-07-15

## 2021-07-15 RX ORDER — NORETHINDRONE ACETATE AND ETHINYL ESTRADIOL .03; 1.5 MG/1; MG/1
1 TABLET ORAL DAILY
Qty: 84 TABLET | Refills: 3 | Status: SHIPPED | OUTPATIENT
Start: 2021-07-15 | End: 2021-07-19

## 2021-07-15 NOTE — TELEPHONE ENCOUNTER
Dr Bourne Marking:    Patient would like to get back on Aurovela FE 1.5/30 OCP. (Norethin ace-eth estrad fe, monthly cycle pack). She was previously on the 3 month cycle BCP and doesn't like them. (due to irregular bleeding)  Pended RX.

## 2021-07-19 ENCOUNTER — TELEPHONE (OUTPATIENT)
Dept: FAMILY MEDICINE CLINIC | Facility: CLINIC | Age: 26
End: 2021-07-19

## 2021-07-19 RX ORDER — NORETHINDRONE ACETATE AND ETHINYL ESTRADIOL .03; 1.5 MG/1; MG/1
1 TABLET ORAL DAILY
Qty: 84 TABLET | Refills: 3 | Status: SHIPPED | OUTPATIENT
Start: 2021-07-19 | End: 2022-07-19

## 2021-07-19 NOTE — TELEPHONE ENCOUNTER
Patient states her birth control needs to be sent to [facility] not [facility]. Medication sent to [facility].

## 2021-07-19 NOTE — TELEPHONE ENCOUNTER
Patient checking status on medication being sent to [facility] [place] and pt states out of medication

## 2021-11-03 ENCOUNTER — OFFICE VISIT (OUTPATIENT)
Dept: FAMILY MEDICINE CLINIC | Facility: CLINIC | Age: 26
End: 2021-11-03
Payer: MEDICAID

## 2021-11-03 VITALS
RESPIRATION RATE: 18 BRPM | DIASTOLIC BLOOD PRESSURE: 85 MMHG | HEART RATE: 118 BPM | HEIGHT: 66.5 IN | WEIGHT: 181.63 LBS | SYSTOLIC BLOOD PRESSURE: 128 MMHG | BODY MASS INDEX: 28.85 KG/M2 | TEMPERATURE: 97 F

## 2021-11-03 DIAGNOSIS — Z01.419 ENCOUNTER FOR GYNECOLOGICAL EXAMINATION WITHOUT ABNORMAL FINDING: Primary | ICD-10-CM

## 2021-11-03 DIAGNOSIS — N94.6 DYSMENORRHEA: ICD-10-CM

## 2021-11-03 DIAGNOSIS — R53.81 CHRONIC FATIGUE AND MALAISE: ICD-10-CM

## 2021-11-03 DIAGNOSIS — G43.109 MIGRAINE WITH AURA AND WITHOUT STATUS MIGRAINOSUS, NOT INTRACTABLE: ICD-10-CM

## 2021-11-03 DIAGNOSIS — R53.82 CHRONIC FATIGUE AND MALAISE: ICD-10-CM

## 2021-11-03 DIAGNOSIS — H91.90 CHANGE IN HEARING, UNSPECIFIED LATERALITY: ICD-10-CM

## 2021-11-03 PROCEDURE — 3008F BODY MASS INDEX DOCD: CPT | Performed by: FAMILY MEDICINE

## 2021-11-03 PROCEDURE — 3074F SYST BP LT 130 MM HG: CPT | Performed by: FAMILY MEDICINE

## 2021-11-03 PROCEDURE — 90471 IMMUNIZATION ADMIN: CPT | Performed by: FAMILY MEDICINE

## 2021-11-03 PROCEDURE — 3079F DIAST BP 80-89 MM HG: CPT | Performed by: FAMILY MEDICINE

## 2021-11-03 PROCEDURE — 90686 IIV4 VACC NO PRSV 0.5 ML IM: CPT | Performed by: FAMILY MEDICINE

## 2021-11-03 PROCEDURE — 99395 PREV VISIT EST AGE 18-39: CPT | Performed by: FAMILY MEDICINE

## 2021-11-03 RX ORDER — SUMATRIPTAN 50 MG/1
TABLET, FILM COATED ORAL
Qty: 9 TABLET | Refills: 1 | Status: SHIPPED | OUTPATIENT
Start: 2021-11-03

## 2021-11-03 NOTE — PROGRESS NOTES
Subjective:   Patient ID: Damaris Child is a 32year old female. Patient presents for Pap and physical.      History/Other:   Review of Systems   Constitutional: Negative. HENT: Positive for hearing loss. Respiratory: Negative.     Cardiovascular: coaching. Fasting labs at [facility].  Flu vaccine given today. Pap smear done today. Migraine with aura and without status migrainosus, not intractable–has visual aura. Not consistently responsive to OTC medication.   Plan to start Imitrex as directed rev

## 2021-11-12 ENCOUNTER — OFFICE VISIT (OUTPATIENT)
Dept: OTOLARYNGOLOGY | Facility: CLINIC | Age: 26
End: 2021-11-12
Payer: MEDICAID

## 2021-11-12 VITALS — HEIGHT: 66.5 IN | WEIGHT: 181 LBS | BODY MASS INDEX: 28.75 KG/M2

## 2021-11-12 DIAGNOSIS — H93.13 TINNITUS OF BOTH EARS: Primary | ICD-10-CM

## 2021-11-12 DIAGNOSIS — H69.83 DYSFUNCTION OF BOTH EUSTACHIAN TUBES: ICD-10-CM

## 2021-11-12 DIAGNOSIS — H61.23 CERUMEN DEBRIS ON TYMPANIC MEMBRANE OF BOTH EARS: ICD-10-CM

## 2021-11-12 PROCEDURE — 3008F BODY MASS INDEX DOCD: CPT | Performed by: SPECIALIST

## 2021-11-12 PROCEDURE — 99203 OFFICE O/P NEW LOW 30 MIN: CPT | Performed by: SPECIALIST

## 2021-11-12 RX ORDER — AZELASTINE 1 MG/ML
2 SPRAY, METERED NASAL 2 TIMES DAILY
Qty: 30 ML | Refills: 3 | Status: SHIPPED | OUTPATIENT
Start: 2021-11-12

## 2021-11-12 NOTE — PROGRESS NOTES
Phyllis Hightower is a 32year old female. Patient presents with:  Hearing Loss: pt c/o both ears changes in hearing, pt states one day loss of hearing then the next day hearing is back up. HPI:   Patient with intermittent hearing loss and tinnitus.   She is Normal.  Turbinates -congestion of the turbinates.    Oral/Oropharynx Lips - Normal, Tonsils - Normal, Tongue - Normal    Neck Exam Inspection - Normal. Palpation - Normal. Parotid gland - Normal. Thyroid gland - Normal.   Lymph Detail Submental. Submandibu

## 2021-11-12 NOTE — PATIENT INSTRUCTIONS
Your ears were fully cleaned of cerumen. You did have nasal congestion. I placed you on a trial of Astelin nasal spray for possible eustachian tube dysfunction. I also ordered an audiogram for your hearing abnormalities.

## 2021-11-15 ENCOUNTER — OFFICE VISIT (OUTPATIENT)
Dept: AUDIOLOGY | Facility: CLINIC | Age: 26
End: 2021-11-15
Payer: MEDICAID

## 2021-11-15 ENCOUNTER — TELEPHONE (OUTPATIENT)
Dept: OTOLARYNGOLOGY | Facility: CLINIC | Age: 26
End: 2021-11-15

## 2021-11-15 DIAGNOSIS — H93.19 TINNITUS, UNSPECIFIED LATERALITY: Primary | ICD-10-CM

## 2021-11-15 PROCEDURE — 92567 TYMPANOMETRY: CPT | Performed by: AUDIOLOGIST

## 2021-11-15 PROCEDURE — 92557 COMPREHENSIVE HEARING TEST: CPT | Performed by: AUDIOLOGIST

## 2021-12-07 ENCOUNTER — OFFICE VISIT (OUTPATIENT)
Dept: OBGYN CLINIC | Facility: CLINIC | Age: 26
End: 2021-12-07
Payer: MEDICAID

## 2021-12-07 VITALS
SYSTOLIC BLOOD PRESSURE: 116 MMHG | HEIGHT: 66 IN | WEIGHT: 183.31 LBS | DIASTOLIC BLOOD PRESSURE: 80 MMHG | BODY MASS INDEX: 29.46 KG/M2

## 2021-12-07 DIAGNOSIS — G43.109 MIGRAINE WITH AURA AND WITHOUT STATUS MIGRAINOSUS, NOT INTRACTABLE: ICD-10-CM

## 2021-12-07 DIAGNOSIS — Z30.41 ENCOUNTER FOR SURVEILLANCE OF CONTRACEPTIVE PILLS: Primary | ICD-10-CM

## 2021-12-07 PROCEDURE — 3074F SYST BP LT 130 MM HG: CPT | Performed by: OBSTETRICS & GYNECOLOGY

## 2021-12-07 PROCEDURE — 3079F DIAST BP 80-89 MM HG: CPT | Performed by: OBSTETRICS & GYNECOLOGY

## 2021-12-07 PROCEDURE — 99244 OFF/OP CNSLTJ NEW/EST MOD 40: CPT | Performed by: OBSTETRICS & GYNECOLOGY

## 2021-12-07 PROCEDURE — 3008F BODY MASS INDEX DOCD: CPT | Performed by: OBSTETRICS & GYNECOLOGY

## 2021-12-07 NOTE — PROGRESS NOTES
NEW GYN H&P     2021  11:33 AM    Patient presents with:  New Patient: new pt referred by PCP for birth control   .     HPI: Patient is a 32year old  LMP 10/1/2021 referred by PCP Dr. Pop Luong for consultation to discuss OCP use and history of mi Depression with anxiety 7/29/2014   • Heavy periods    • Migraine 2008    with aura    • Other acne 8/25/2014   • Tachycardia      Past Surgical History:   Procedure Laterality Date   • PATIENT DENIES ANY SURGICAL HISTORY       No Known Allergies  Family H

## 2021-12-17 RX ORDER — ESCITALOPRAM OXALATE 10 MG/1
10 TABLET ORAL DAILY
Qty: 90 TABLET | Refills: 1 | Status: SHIPPED | OUTPATIENT
Start: 2021-12-17 | End: 2022-06-14

## 2021-12-17 NOTE — TELEPHONE ENCOUNTER
Refill passed per [facility] protocol.       Requested Prescriptions   Pending Prescriptions Disp Refills    ESCITALOPRAM 10 MG Oral Tab [Pharmacy Med Name: ESCITALOPRAM 10MG TABLETS] 90 tablet 1     Sig: TAKE 1 TABLET(10 MG) BY MOUTH DAILY        Psychiatric Non-Scheduled (Anti-Anxiety) Passed - 12/17/2021 10:00 AM        Passed - Appointment in last 6 or next 3 months                   Recent Outpatient Visits              1 week ago Encounter for surveillance of contraceptive pills    [address], Joseph Mclaughlin MD    Office Visit    1 month ago Tinnitus, unspecified laterality    [facility]    Office Visit    1 month ago Tinnitus of both ears    [facility] Thierry Simons MD    Office Visit    1 month ago Encounter for gynecological examination without abnormal finding    [facility], Maggie Riley, Juliette Diaz MD    Office Visit    1 year ago Routine physical examination    [facility], [address], [facility] [address] Jannette Perez MD    Office Visit

## 2022-01-15 RX ORDER — SPIRONOLACTONE 100 MG/1
TABLET, FILM COATED ORAL
Qty: 90 TABLET | Refills: 3 | Status: SHIPPED | OUTPATIENT
Start: 2022-01-15

## 2022-03-24 LAB — AMB EXT COVID-19 RESULT: DETECTED

## 2022-03-28 ENCOUNTER — TELEPHONE (OUTPATIENT)
Dept: FAMILY MEDICINE CLINIC | Facility: CLINIC | Age: 27
End: 2022-03-28

## 2022-03-28 ENCOUNTER — TELEMEDICINE (OUTPATIENT)
Dept: FAMILY MEDICINE CLINIC | Facility: CLINIC | Age: 27
End: 2022-03-28

## 2022-03-28 DIAGNOSIS — U07.1 COVID-19: Primary | ICD-10-CM

## 2022-03-28 PROCEDURE — 99213 OFFICE O/P EST LOW 20 MIN: CPT | Performed by: FAMILY MEDICINE

## 2022-03-28 RX ORDER — MECLIZINE HYDROCHLORIDE 25 MG/1
25 TABLET ORAL 3 TIMES DAILY PRN
Qty: 45 TABLET | Refills: 0 | Status: SHIPPED | OUTPATIENT
Start: 2022-03-28

## 2022-03-28 NOTE — PROGRESS NOTES
Last menstrual period 10/01/2021, not currently breastfeeding. Video visit. Patient presents today complaining of nasal congestion and dizziness for the past week no improvement. Tested positive for Covid. Denies dyspnea. Some nocturnal cough. Some relief with Advil and Sudafed. Feels like the room is spinning when she stands up. She does not have asthma and denies pregnancy. No surgical history.     Objective    Patient is comfortable no apparent distress    Assessment COVID symptomatic    Plan advised patient that if the symptoms do not improve she is still considered contagious    Advised her to go to ER ASAP if she develops difficulty breathing    Sudafed during the day NyQuil at night    Meclizine for dizziness    Follow-up if no improvement

## 2022-03-28 NOTE — TELEPHONE ENCOUNTER
Action Requested: Summary for Provider     []  Critical Lab, Recommendations Needed  [] Need Additional Advice  []   FYI    []   Need Orders  [] Need Medications Sent to Pharmacy  []  Other     SUMMARY: Per protocol: Video visit     Reason for call:Covid positive- mild unresolving symptoms      Has had cold like symptoms x 1 week; Tested positive for Covid on 3/24. Symptoms unimproved   Cough, runny nose, sore throat, low grade fever  Dizziness with position changes. Scheduled for video visit for evaluation. Managing symptoms with: rest, increased fluids, OTC medications : advil     Patient scheduled for video visit. Patient advised to complete the e-check in in [employer], if active. Understands to follow the prompts and links to complete the visit. Patient advised that there may be a co-pay involved in this type of visit. Patient agreed to proceed, they understand the provider may be calling from a blocked, or unknown phone number on their caller ID and they know to answer the phone. Best call back:  [phone]      Patient verbalizes understanding and agrees with plan.

## 2022-06-14 RX ORDER — ESCITALOPRAM OXALATE 10 MG/1
TABLET ORAL
Qty: 90 TABLET | Refills: 1 | Status: SHIPPED | OUTPATIENT
Start: 2022-06-14

## 2022-07-25 ENCOUNTER — PATIENT MESSAGE (OUTPATIENT)
Dept: FAMILY MEDICINE CLINIC | Facility: CLINIC | Age: 27
End: 2022-07-25

## 2022-07-25 ENCOUNTER — TELEPHONE (OUTPATIENT)
Dept: OBGYN CLINIC | Facility: CLINIC | Age: 27
End: 2022-07-25

## 2022-07-25 ENCOUNTER — TELEPHONE (OUTPATIENT)
Dept: FAMILY MEDICINE CLINIC | Facility: CLINIC | Age: 27
End: 2022-07-25

## 2022-07-25 DIAGNOSIS — H43.393 FLOATERS, BILATERAL: Primary | ICD-10-CM

## 2022-07-25 RX ORDER — NORETHINDRONE ACETATE AND ETHINYL ESTRADIOL .03; 1.5 MG/1; MG/1
1 TABLET ORAL DAILY
Qty: 84 TABLET | Refills: 0 | Status: SHIPPED | OUTPATIENT
Start: 2022-07-25 | End: 2023-07-25

## 2022-07-25 NOTE — TELEPHONE ENCOUNTER
Called patient, confirmed name and . She states that she feels that her migraines are well-enough controlled by her medication at this time and agrees to go to ER if they worsen or she develops new symptoms. Assisted to schedule appointment:    Future Appointments   Date Time Provider [place]   2022 11:30 AM Kiara Eckert MD [address] [place] [address]     Patient instructed to inform office if she is unable to secure an appointment with optha this week. Patient verbalized understanding and agrees.     Referral pended for approval.

## 2022-07-25 NOTE — TELEPHONE ENCOUNTER
----- Message -----  From: Ramirez Agrawal  Sent: 7/25/2022  11:35 AM CDT  To: Em Rn Triage  Subject: Eye problem and migraines                        Hey Dr. Edmund Velázquez had more ocular migraines than usual recently, including 2 in the past week that have really knocked me out with intense pain and vomiting, dizziness and weakness. I wondered if you had any advice for that? Also, ever since the migraines I've had a lot more lights/floaters in my eyes, which makes it slightly difficult to read. I wondered if you could refer me to an eye doctor to check on those. I used to go to [facility] but they don't take my insurance unless I'm referred I don't think. Thanks,  Maryam Handing        From Dr. Donna Mars:  I recommend ER if she is having current severe migraine without relief. Otherwise okay to just add onto my schedule Wednesday at noon. I recommend ophthalmology Dr. Houston Speaker, and she should let us know if she cannot get appointment this week, we can call them and they will get her in.

## 2022-07-25 NOTE — TELEPHONE ENCOUNTER
RN spoke with pt and relayed EB's instructions. Pt wants to start taking BC pills again to see if they help with her cramps. Pt wants to cancel her upcoming appt with EB to give the [facility] pills time to work before seeing EB. RN told pt that she should see a neurologist for her migraines and pt said that she will f/u with her PCP. Pt declined EB's offer of Fioricet. RN told pt to call the office and sked an appt with EB if her cramps don't improve. Pt verbalized understanding and agreed with POC.     Rx for Aurovela sent to preferred pharmacy (verified), per pt request.

## 2022-07-25 NOTE — TELEPHONE ENCOUNTER
Pt stated her period ended 2 weeks ago, and she still has cramps every day since and she also she has had more ocular migraines.     Please advise

## 2022-07-25 NOTE — TELEPHONE ENCOUNTER
RN spoke with pt. Pt states that she has off of BC since 12/2021, per EB's suggestion. Pt had COVID in March 2022. Over the past 2 months, the pt says that she has had more frequent migraines (up to 2/week) and more intense cramping , which can last for weeks. Pt says that the cramping (rated as 5/10) is in her lower back and is slightly improved with Advil. Pt denies discharge, foul smell, burning with urination or urinary frequency. She has had 2 migraines in the past week which were the worst she's ever had and caused her to vomit. RN sked pt for appt with EB on 8/10. Pt verbalized understanding and agreed with POC. Pt asked to have her concerns routed to EB to see if she has any suggestions in the meantime. RN agreed.

## 2022-07-25 NOTE — TELEPHONE ENCOUNTER
ER if current intractable headache. Otherwise okay to add on my schedule at 12 noon on 7/27. Recommend ophthalmology Dr. Marisela Prater, she should let us know if she cannot get appointment within 1 week.

## 2022-07-26 ENCOUNTER — OFFICE VISIT (OUTPATIENT)
Dept: FAMILY MEDICINE CLINIC | Facility: CLINIC | Age: 27
End: 2022-07-26
Payer: MEDICAID

## 2022-07-26 VITALS
DIASTOLIC BLOOD PRESSURE: 76 MMHG | TEMPERATURE: 98 F | SYSTOLIC BLOOD PRESSURE: 122 MMHG | WEIGHT: 185 LBS | BODY MASS INDEX: 30 KG/M2 | HEART RATE: 88 BPM

## 2022-07-26 DIAGNOSIS — G43.109 MIGRAINE WITH AURA AND WITHOUT STATUS MIGRAINOSUS, NOT INTRACTABLE: Primary | ICD-10-CM

## 2022-07-26 PROCEDURE — 3078F DIAST BP <80 MM HG: CPT | Performed by: FAMILY MEDICINE

## 2022-07-26 PROCEDURE — 3074F SYST BP LT 130 MM HG: CPT | Performed by: FAMILY MEDICINE

## 2022-07-26 PROCEDURE — 99213 OFFICE O/P EST LOW 20 MIN: CPT | Performed by: FAMILY MEDICINE

## 2022-07-26 RX ORDER — MELATONIN
1 DAILY
Qty: 90 TABLET | Refills: 3 | Status: SHIPPED | OUTPATIENT
Start: 2022-07-26 | End: 2023-07-21

## 2022-07-27 ENCOUNTER — TELEPHONE (OUTPATIENT)
Dept: FAMILY MEDICINE CLINIC | Facility: CLINIC | Age: 27
End: 2022-07-27

## 2022-07-27 ENCOUNTER — TELEPHONE (OUTPATIENT)
Dept: OBGYN CLINIC | Facility: CLINIC | Age: 27
End: 2022-07-27

## 2022-07-27 NOTE — TELEPHONE ENCOUNTER
The patient was calling about questions about birth control. She spoke with the doctor about it before but has more questions about it now.

## 2022-07-27 NOTE — TELEPHONE ENCOUNTER
Called pt having cramps without period, back on OCP but her PCP asked her to stop d/t ocular HA and possible stroke. Informed pt of progesterone birth control but would need to see EB to discuss options. Pt agrees and scheduled for 08/11/2022.

## 2022-08-01 RX ORDER — RIZATRIPTAN BENZOATE 10 MG/1
10 TABLET ORAL AS NEEDED
Qty: 9 TABLET | Refills: 1 | Status: SHIPPED | OUTPATIENT
Start: 2022-08-01

## 2022-08-01 NOTE — TELEPHONE ENCOUNTER
Spoke to Sandra from [employer] and the denial letter was faxed to [phone]    I have asked Sandra to fax it to [phone]

## 2022-08-01 NOTE — TELEPHONE ENCOUNTER
Please let patient know about insurance denial.  They will not cover the ubrogenpaint  unless she has tried and failed 2 triptans. So I sent in Rx for rizatriptan. Please try this, let us know if not effective and we will try to [place].

## 2022-08-11 ENCOUNTER — OFFICE VISIT (OUTPATIENT)
Dept: OBGYN CLINIC | Facility: CLINIC | Age: 27
End: 2022-08-11
Payer: MEDICAID

## 2022-08-11 VITALS
WEIGHT: 188.19 LBS | BODY MASS INDEX: 30.24 KG/M2 | HEIGHT: 66 IN | SYSTOLIC BLOOD PRESSURE: 120 MMHG | DIASTOLIC BLOOD PRESSURE: 72 MMHG

## 2022-08-11 DIAGNOSIS — N94.6 DYSMENORRHEA: Primary | ICD-10-CM

## 2022-08-11 PROCEDURE — 3008F BODY MASS INDEX DOCD: CPT | Performed by: OBSTETRICS & GYNECOLOGY

## 2022-08-11 PROCEDURE — 99213 OFFICE O/P EST LOW 20 MIN: CPT | Performed by: OBSTETRICS & GYNECOLOGY

## 2022-08-11 PROCEDURE — 3074F SYST BP LT 130 MM HG: CPT | Performed by: OBSTETRICS & GYNECOLOGY

## 2022-08-11 PROCEDURE — 3078F DIAST BP <80 MM HG: CPT | Performed by: OBSTETRICS & GYNECOLOGY

## 2022-09-12 ENCOUNTER — PATIENT MESSAGE (OUTPATIENT)
Dept: OBGYN CLINIC | Facility: CLINIC | Age: 27
End: 2022-09-12

## 2022-09-12 DIAGNOSIS — R10.2 PELVIC PAIN: Primary | ICD-10-CM

## 2022-10-10 ENCOUNTER — HOSPITAL ENCOUNTER (OUTPATIENT)
Dept: ULTRASOUND IMAGING | Facility: HOSPITAL | Age: 27
Discharge: HOME | End: 2022-10-10
Attending: OBSTETRICS & GYNECOLOGY
Payer: MEDICAID

## 2022-10-10 DIAGNOSIS — R10.2 PELVIC PAIN: ICD-10-CM

## 2022-10-10 PROCEDURE — 76830 TRANSVAGINAL US NON-OB: CPT | Performed by: OBSTETRICS & GYNECOLOGY

## 2022-10-10 PROCEDURE — 76856 US EXAM PELVIC COMPLETE: CPT | Performed by: OBSTETRICS & GYNECOLOGY

## 2022-12-08 ENCOUNTER — LAB ENCOUNTER (OUTPATIENT)
Dept: LAB | Age: 27
End: 2022-12-08
Attending: FAMILY MEDICINE
Payer: MEDICAID

## 2022-12-08 ENCOUNTER — OFFICE VISIT (OUTPATIENT)
Dept: FAMILY MEDICINE CLINIC | Facility: CLINIC | Age: 27
End: 2022-12-08
Payer: MEDICAID

## 2022-12-08 VITALS
SYSTOLIC BLOOD PRESSURE: 126 MMHG | DIASTOLIC BLOOD PRESSURE: 85 MMHG | HEIGHT: 66.93 IN | WEIGHT: 181.38 LBS | BODY MASS INDEX: 28.47 KG/M2 | HEART RATE: 90 BPM | TEMPERATURE: 98 F

## 2022-12-08 DIAGNOSIS — F40.10 SOCIAL ANXIETY DISORDER: ICD-10-CM

## 2022-12-08 DIAGNOSIS — H53.19 VISUAL SNOW SYNDROME: ICD-10-CM

## 2022-12-08 DIAGNOSIS — R90.89 MRI OF BRAIN ABNORMAL: ICD-10-CM

## 2022-12-08 DIAGNOSIS — R51.9 UNILATERAL HEADACHE: ICD-10-CM

## 2022-12-08 DIAGNOSIS — Z00.00 ROUTINE PHYSICAL EXAMINATION: Primary | ICD-10-CM

## 2022-12-08 PROCEDURE — 99395 PREV VISIT EST AGE 18-39: CPT | Performed by: FAMILY MEDICINE

## 2022-12-08 PROCEDURE — 3008F BODY MASS INDEX DOCD: CPT | Performed by: FAMILY MEDICINE

## 2022-12-08 PROCEDURE — 90715 TDAP VACCINE 7 YRS/> IM: CPT | Performed by: FAMILY MEDICINE

## 2022-12-08 PROCEDURE — 90471 IMMUNIZATION ADMIN: CPT | Performed by: FAMILY MEDICINE

## 2022-12-08 PROCEDURE — 3074F SYST BP LT 130 MM HG: CPT | Performed by: FAMILY MEDICINE

## 2022-12-08 PROCEDURE — 3079F DIAST BP 80-89 MM HG: CPT | Performed by: FAMILY MEDICINE

## 2022-12-08 RX ORDER — LAMOTRIGINE 25 MG/1
25 TABLET ORAL DAILY
COMMUNITY

## 2022-12-08 RX ORDER — RIZATRIPTAN BENZOATE 10 MG/1
10 TABLET ORAL AS NEEDED
Qty: 9 TABLET | Refills: 1 | Status: SHIPPED | OUTPATIENT
Start: 2022-12-08

## 2022-12-08 RX ORDER — SPIRONOLACTONE 100 MG/1
100 TABLET, FILM COATED ORAL DAILY
Qty: 90 TABLET | Refills: 3 | Status: SHIPPED | OUTPATIENT
Start: 2022-12-08

## 2022-12-08 RX ORDER — ESCITALOPRAM OXALATE 10 MG/1
10 TABLET ORAL DAILY
Qty: 90 TABLET | Refills: 3 | Status: SHIPPED | OUTPATIENT
Start: 2022-12-08

## 2022-12-09 LAB
BUN/CREATININE RATIO: 15 (CALC) (ref 6–22)
BUN: 16 MG/DL (ref 7–25)
CALCIUM: 9.8 MG/DL (ref 8.6–10.2)
CARBON DIOXIDE: 28 MMOL/L (ref 20–32)
CHLORIDE: 106 MMOL/L (ref 98–110)
CHOL/HDLC RATIO: 4.9 (CALC)
CHOLESTEROL, TOTAL: 192 MG/DL
CREATININE: 1.05 MG/DL (ref 0.5–0.96)
EGFR: 75 ML/MIN/1.73M2
GLUCOSE: 99 MG/DL (ref 65–99)
HDL CHOLESTEROL: 39 MG/DL
HEMATOCRIT: 37.7 % (ref 35–45)
HEMOGLOBIN: 12.7 G/DL (ref 11.7–15.5)
LDL-CHOLESTEROL: 121 MG/DL (CALC)
MCH: 29.9 PG (ref 27–33)
MCHC: 33.7 G/DL (ref 32–36)
MCV: 88.7 FL (ref 80–100)
NON-HDL CHOLESTEROL: 153 MG/DL (CALC)
POTASSIUM: 4.4 MMOL/L (ref 3.5–5.3)
RDW: 13 % (ref 11–15)
RED BLOOD CELL COUNT: 4.25 MILLION/UL (ref 3.8–5.1)
SODIUM: 140 MMOL/L (ref 135–146)
TRIGLYCERIDES: 208 MG/DL
WHITE BLOOD CELL COUNT: 7.2 THOUSAND/UL (ref 3.8–10.8)

## 2023-01-26 ENCOUNTER — NURSE TRIAGE (OUTPATIENT)
Dept: FAMILY MEDICINE CLINIC | Facility: CLINIC | Age: 28
End: 2023-01-26

## 2023-04-25 ENCOUNTER — NURSE TRIAGE (OUTPATIENT)
Dept: FAMILY MEDICINE CLINIC | Facility: CLINIC | Age: 28
End: 2023-04-25

## 2023-04-25 ENCOUNTER — OFFICE VISIT (OUTPATIENT)
Dept: INTERNAL MEDICINE CLINIC | Facility: CLINIC | Age: 28
End: 2023-04-25

## 2023-04-25 VITALS
RESPIRATION RATE: 16 BRPM | HEART RATE: 97 BPM | SYSTOLIC BLOOD PRESSURE: 126 MMHG | DIASTOLIC BLOOD PRESSURE: 83 MMHG | WEIGHT: 181 LBS | BODY MASS INDEX: 29.09 KG/M2 | HEIGHT: 66 IN

## 2023-04-25 DIAGNOSIS — R35.0 FREQUENCY OF URINATION: Primary | ICD-10-CM

## 2023-04-25 LAB
APPEARANCE: CLEAR
BILIRUBIN: NEGATIVE
GLUCOSE (URINE DIPSTICK): NEGATIVE MG/DL
KETONES (URINE DIPSTICK): NEGATIVE MG/DL
MULTISTIX LOT#: ABNORMAL NUMERIC
NITRITE, URINE: NEGATIVE
OCCULT BLOOD: NEGATIVE
PH, URINE: 7.5 (ref 4.5–8)
PROTEIN (URINE DIPSTICK): NEGATIVE MG/DL
SPECIFIC GRAVITY: 1.01 (ref 1–1.03)
URINE-COLOR: YELLOW
UROBILINOGEN,SEMI-QN: 0.2 MG/DL (ref 0–1.9)

## 2023-04-25 PROCEDURE — 3074F SYST BP LT 130 MM HG: CPT | Performed by: NURSE PRACTITIONER

## 2023-04-25 PROCEDURE — 3008F BODY MASS INDEX DOCD: CPT | Performed by: NURSE PRACTITIONER

## 2023-04-25 PROCEDURE — 99203 OFFICE O/P NEW LOW 30 MIN: CPT | Performed by: NURSE PRACTITIONER

## 2023-04-25 PROCEDURE — 81002 URINALYSIS NONAUTO W/O SCOPE: CPT | Performed by: NURSE PRACTITIONER

## 2023-04-25 PROCEDURE — 3079F DIAST BP 80-89 MM HG: CPT | Performed by: NURSE PRACTITIONER

## 2023-04-25 RX ORDER — CEPHALEXIN 500 MG/1
500 CAPSULE ORAL 2 TIMES DAILY
Qty: 14 CAPSULE | Refills: 0 | Status: SHIPPED | OUTPATIENT
Start: 2023-04-25 | End: 2023-05-02

## 2023-05-30 ENCOUNTER — NURSE TRIAGE (OUTPATIENT)
Dept: FAMILY MEDICINE CLINIC | Facility: CLINIC | Age: 28
End: 2023-05-30

## 2023-06-01 ENCOUNTER — LAB ENCOUNTER (OUTPATIENT)
Dept: LAB | Age: 28
End: 2023-06-01
Attending: FAMILY MEDICINE
Payer: MEDICAID

## 2023-06-01 ENCOUNTER — OFFICE VISIT (OUTPATIENT)
Dept: FAMILY MEDICINE CLINIC | Facility: CLINIC | Age: 28
End: 2023-06-01

## 2023-06-01 VITALS
DIASTOLIC BLOOD PRESSURE: 69 MMHG | TEMPERATURE: 97 F | HEART RATE: 86 BPM | WEIGHT: 175.25 LBS | SYSTOLIC BLOOD PRESSURE: 105 MMHG | BODY MASS INDEX: 28 KG/M2

## 2023-06-01 DIAGNOSIS — K11.7 XEROSTOMIA: Primary | ICD-10-CM

## 2023-06-01 LAB
GLUCOSE BLOOD: 121
TEST STRIP LOT #: ABNORMAL NUMERIC

## 2023-06-01 PROCEDURE — 3074F SYST BP LT 130 MM HG: CPT | Performed by: FAMILY MEDICINE

## 2023-06-01 PROCEDURE — 82962 GLUCOSE BLOOD TEST: CPT | Performed by: FAMILY MEDICINE

## 2023-06-01 PROCEDURE — 99214 OFFICE O/P EST MOD 30 MIN: CPT | Performed by: FAMILY MEDICINE

## 2023-06-01 PROCEDURE — 3078F DIAST BP <80 MM HG: CPT | Performed by: FAMILY MEDICINE

## 2023-06-01 RX ORDER — LAMOTRIGINE 100 MG/1
100 TABLET ORAL DAILY
COMMUNITY

## 2023-06-02 LAB
BUN/CREATININE RATIO: 13 (CALC) (ref 6–22)
BUN: 15 MG/DL (ref 7–25)
CALCIUM: 10 MG/DL (ref 8.6–10.2)
CARBON DIOXIDE: 27 MMOL/L (ref 20–32)
CHLORIDE: 105 MMOL/L (ref 98–110)
CREATININE: 1.15 MG/DL (ref 0.5–0.96)
EGFR: 67 ML/MIN/1.73M2
GLUCOSE: 83 MG/DL (ref 65–99)
POTASSIUM: 4.6 MMOL/L (ref 3.5–5.3)
SODIUM: 140 MMOL/L (ref 135–146)

## 2023-10-24 ENCOUNTER — NURSE TRIAGE (OUTPATIENT)
Dept: FAMILY MEDICINE CLINIC | Facility: CLINIC | Age: 28
End: 2023-10-24

## 2023-10-24 NOTE — TELEPHONE ENCOUNTER
Action Requested: Summary for Provider     []  Critical Lab, Recommendations Needed  [] Need Additional Advice  []   FYI    []   Need Orders  [] Need Medications Sent to Pharmacy  []  Other     SUMMARY: Per Protocol disposition advised to be seen in the office for ongoing throat pain. There are limited appts open today. Assisted patient with appt scheduling, verbalized understanding and agrees to plan. Future Appointments   Date Time Provider [place]   10/25/2023 11:00 AM JACOB Ramos   2024  1:15 PM Fei Zamudio MD [address] [place] [address]       Reason for call: Sore Throat  Onset: 3 weeks    Patient (name and  verified) c/o sore throat intermittent for 3 weeks. Patient also c/o sinus congestion, post nasal drip, and cough. Denies any ear pain. Patient did a home covid test several weeks ago and it was negative.    Reason for Disposition   Patient wants to be seen    Protocols used: Sore Throat-A-OH

## 2023-10-25 ENCOUNTER — OFFICE VISIT (OUTPATIENT)
Dept: FAMILY MEDICINE CLINIC | Facility: CLINIC | Age: 28
End: 2023-10-25

## 2023-10-25 VITALS
BODY MASS INDEX: 28.77 KG/M2 | WEIGHT: 179 LBS | HEART RATE: 102 BPM | SYSTOLIC BLOOD PRESSURE: 128 MMHG | HEIGHT: 66 IN | DIASTOLIC BLOOD PRESSURE: 70 MMHG | TEMPERATURE: 99 F

## 2023-10-25 DIAGNOSIS — J02.9 PHARYNGITIS, UNSPECIFIED ETIOLOGY: Primary | ICD-10-CM

## 2023-10-25 DIAGNOSIS — J02.9 SORE THROAT: ICD-10-CM

## 2023-10-25 DIAGNOSIS — R05.1 ACUTE COUGH: ICD-10-CM

## 2023-10-25 DIAGNOSIS — R49.0 HOARSE VOICE QUALITY: ICD-10-CM

## 2023-10-25 LAB
CONTROL LINE PRESENT WITH A CLEAR BACKGROUND (YES/NO): YES YES/NO
KIT LOT #: NORMAL NUMERIC
STREP GRP A CUL-SCR: NEGATIVE

## 2023-10-25 PROCEDURE — 3078F DIAST BP <80 MM HG: CPT

## 2023-10-25 PROCEDURE — 3074F SYST BP LT 130 MM HG: CPT

## 2023-10-25 PROCEDURE — 99213 OFFICE O/P EST LOW 20 MIN: CPT

## 2023-10-25 PROCEDURE — 3008F BODY MASS INDEX DOCD: CPT

## 2023-10-25 PROCEDURE — 87880 STREP A ASSAY W/OPTIC: CPT

## 2023-10-25 RX ORDER — METHYLPREDNISOLONE 4 MG/1
TABLET ORAL
Qty: 21 EACH | Refills: 0 | Status: SHIPPED | OUTPATIENT
Start: 2023-10-25

## 2023-12-09 ENCOUNTER — TELEPHONE (OUTPATIENT)
Dept: FAMILY MEDICINE CLINIC | Facility: CLINIC | Age: 28
End: 2023-12-09

## 2023-12-09 RX ORDER — ESCITALOPRAM OXALATE 10 MG/1
10 TABLET ORAL DAILY
Qty: 90 TABLET | Refills: 1 | Status: SHIPPED | OUTPATIENT
Start: 2023-12-09

## 2023-12-09 RX ORDER — ESCITALOPRAM OXALATE 10 MG/1
10 TABLET ORAL DAILY
Qty: 90 TABLET | Refills: 3 | Status: CANCELLED | OUTPATIENT
Start: 2023-12-09

## 2023-12-28 RX ORDER — SPIRONOLACTONE 100 MG/1
100 TABLET, FILM COATED ORAL DAILY
Qty: 90 TABLET | Refills: 3 | Status: SHIPPED | OUTPATIENT
Start: 2023-12-28

## 2023-12-28 NOTE — TELEPHONE ENCOUNTER
Please review; protocol failed/ has no protocol    No active /future labs noted   Requested Prescriptions   Pending Prescriptions Disp Refills    SPIRONOLACTONE 100 MG Oral Tab [Pharmacy Med Name: SPIRONOLACTONE 100MG TABLETS] 90 tablet 3     Sig: TAKE 1 TABLET(100 MG) BY MOUTH DAILY       Hypertensive Medications Protocol Failed - 12/26/2023  2:58 PM        Failed - CMP or BMP in past 6 months     No results found for this or any previous visit (from the past 4392 hour(s)).             Passed - In person appointment in the past 12 or next 3 months     Recent Outpatient Visits              2 months ago Pharyngitis, unspecified etiology    [address] [place] [address] JACOB Garcia    Office Visit    7 months ago Xerostomia    [address] [place] [address], Belem Alba MD    Office Visit    8 months ago Frequency of urination    Cass Weinberg, [address]    Office Visit    1 year ago Routine physical examination    [address] [place] [address], Belem Alba MD    Office Visit    1 year ago [facility] [address]    Office Visit          Future Appointments         Provider Department Appt Notes    In 2 weeks MD Cass Terrazas, [address] [place] [address]    In 2 weeks Rah Johnson MD [address] [place] [address] px   last px 12/08/22               Passed - Last BP reading less than 140/90     BP Readings from Last 1 Encounters:   10/25/23 128/70               Passed - In person appointment or virtual visit in the past 6 months     Recent Outpatient Visits              2 months ago Pharyngitis, unspecified etiology    [facility], [address], [facility] JACOB Garcia    Office Visit    7 months ago Xerostomia [address] [facility], [facility] [address] Rj Zarco MD    Office Visit    8 months ago Frequency of urination    [address] [facility], [facility], APR    Office Visit    1 year ago Routine physical examination    [address] [facility], [facility] [address] Rj Zarco MD    Office Visit    1 year ago [address] [facility],  Cours Rl Aiken MD    Office Visit          Future Appointments         Provider Department Appt Notes    In 2 weeks MD Melany [employer], [address] [facility], [address] [place] [address]    In 2 weeks MD Melany [employer], [address], [facility] [address] px   last px 12/08/22               Passed - EGFRCR or GFRNAA > 50     GFR Evaluation  EGFRCR: 67 , resulted on 6/1/2023             Recent Outpatient Visits              2 months ago Pharyngitis, unspecified etiology    [facility], [address], [facility] Mart Dandy, APRN    Office Visit    7 months ago Xerostomia    [address] [facility], [facility] [address] Rj Zarco MD    Office Visit    8 months ago Frequency of urination    [employer], [address] [facility] [place], APR    Office Visit    1 year ago Routine physical examination    [address] [facility], Janice Henry MD    Office Visit    1 year ago [address] [facility],  Cours Rl Aiken MD    Office Visit          Future Appointments         Provider Department Appt Notes    In 2 weeks MD [employer], [address] [facility], [address] [place] [address]    In 2 weeks Rj Zarco MD [address] [facility], [facility] [address] px   last px 12/08/22

## 2024-01-22 ENCOUNTER — OFFICE VISIT (OUTPATIENT)
Dept: DERMATOLOGY CLINIC | Facility: CLINIC | Age: 29
End: 2024-01-22

## 2024-01-22 DIAGNOSIS — L72.3 SEBACEOUS CYST: Primary | ICD-10-CM

## 2024-01-22 DIAGNOSIS — L70.0 ACNE VULGARIS: ICD-10-CM

## 2024-01-22 PROCEDURE — 99204 OFFICE O/P NEW MOD 45 MIN: CPT | Performed by: STUDENT IN AN ORGANIZED HEALTH CARE EDUCATION/TRAINING PROGRAM

## 2024-01-22 NOTE — PROGRESS NOTES
CHIEF COMPLAINT: acne, lesion of concern    HISTORY OF PRESENT ILLNESS: .    1. acne  Location: chin   Duration: chronic  Signs and symptoms: mild acne  Current treatment: aldactone 100mg daily  Past treatments: topicals    2. lesion  Location: right cheek   Duration: chronic  Signs and symptoms: cyst-like  Current treatment: none  Past treatments: none        DERM HISTORY:  History of skin cancer: No  History of chronic skin disease/condition: No    FAMILY HISTORY:  History of melanoma: No  History of chronic skin disease/condition: No    History/Other:    REVIEW OF SYSTEMS:  Constitutional: Denies fever, chills, unintentional weight loss.   Skin as per HPI    PAST MEDICAL HISTORY:  Past Medical History:   Diagnosis Date    Acne     Depression with anxiety 7/29/2014    Heavy periods     Migraine 2008    with aura     Other acne 8/25/2014    Tachycardia        Medications  Current Outpatient Medications   Medication Sig Dispense Refill    spironolactone 100 MG Oral Tab Take 1 tablet (100 mg total) by mouth daily. 90 tablet 3    escitalopram 10 MG Oral Tab Take 1 tablet (10 mg total) by mouth daily. 90 tablet 1    lamoTRIgine 100 MG Oral Tab Take 1 tablet (100 mg total) by mouth daily.      Rizatriptan Benzoate 10 MG Oral Tab Take 1 tablet (10 mg total) by mouth as needed for Migraine. May repeat dose in 2 hours.  Maximum dose 2 tablets in 24 hours. 9 tablet 1    methylPREDNISolone (MEDROL) 4 MG Oral Tablet Therapy Pack As directed. (Patient not taking: Reported on 1/22/2024) 21 each 0       Objective:    PHYSICAL EXAM:  General: awake, alert, no acute distress  Skin: Skin exam was performed today including the following: face. Pertinent findings include:   - r cheek with subcutaneous nodule with punctum   - chin with numerous erythematous papules    ASSESSMENT & PLAN:  Pathophysiology of diagnoses discussed with patient.  Therapeutic options reviewed. Risks, benefits, and alternatives discussed with patient.  Instructions reviewed at length.    #Epidermoid cyst   - Referral to Dr. Yoon    #Acne vulgaris   - Continue aldactone 100mg.  - Unable to take OCP due to migraines  - Start compounded cream with clindamycin 1%, dapsone 6.5%, and tretinoin 0.025%. Use pea sized amount once nightly. Sent to [facility] pharmacy today.     Return to clinic: 3 months or sooner if something concerning arises     The note was routed electronically to the referring physician.    Kiel Dee MD

## 2024-01-29 ENCOUNTER — OFFICE VISIT (OUTPATIENT)
Dept: OTOLARYNGOLOGY | Facility: CLINIC | Age: 29
End: 2024-01-29
Payer: MEDICAID

## 2024-01-29 DIAGNOSIS — L72.0 EPIDERMAL CYST OF FACE: Primary | ICD-10-CM

## 2024-01-30 ENCOUNTER — TELEPHONE (OUTPATIENT)
Dept: OTOLARYNGOLOGY | Facility: CLINIC | Age: 29
End: 2024-01-30

## 2024-01-30 ENCOUNTER — NURSE TRIAGE (OUTPATIENT)
Dept: FAMILY MEDICINE CLINIC | Facility: CLINIC | Age: 29
End: 2024-01-30

## 2024-01-30 NOTE — TELEPHONE ENCOUNTER
Action Requested: Summary for Provider     []  Critical Lab, Recommendations Needed  [] Need Additional Advice  []   FYI    []   Need Orders  [] Need Medications Sent to Pharmacy  []  Other     SUMMARY: Per protocol disposition advised office visit within 3 days     Future Appointments   Date Time Provider Department Center   2/1/2024 11:45 AM Lidia Garza MD [facility]   3/7/2024  1:45 PM Lidia Garza MD [facility]   4/24/2024 10:00 AM Kiel Dee MD [facility]     Reason for call: Toe Pain (/)  Onset: 3-4 months     Big toe pain to right foot for 3-4 months, intermittent but more frequent lately. \"Stabbing\" pain rated as moderate. No known injury. No redness, discoloration, warmth or other signs of injection. No known injury. Denies other symptoms marked no under protocol.     Reason for Disposition   MODERATE pain (e.g., interferes with normal activities, limping) and present > 3 days    Protocols used: Toe Pain-A-OH

## 2024-01-30 NOTE — PROGRESS NOTES
Katy Polo is a 28 year old female.    Chief Complaint   Patient presents with    Mass     Mass on right cheek        HISTORY OF PRESENT ILLNESS    She presents with a small lesion of her right cheek which has been getting bigger in size over time.  Does not drain nothing comes out of it does not cause her pain but sticks out.  She uses make-up on it to hide it looks even worse.  Seen by dermatologist and sent by  for my opinion regarding management of this lesion.    Social History     Socioeconomic History    Marital status: Single   Occupational History    Occupation:     Tobacco Use    Smoking status: Never    Smokeless tobacco: Never   Vaping Use    Vaping Use: Never used   Substance and Sexual Activity    Alcohol use: No    Drug use: No    Sexual activity: Never     Comment: never been sexual active    Other Topics Concern    Pt has a pacemaker No    Pt has a defibrillator No    Reaction to local anesthetic No    Blood Transfusions No       Family History   Problem Relation Age of Onset    Hypertension Mother     Obesity Mother     Heart Disorder Maternal Grandfather     Cancer Maternal Grandfather     Skin cancer Maternal Grandfather     Prostate Cancer Father        Past Medical History:   Diagnosis Date    Acne     Depression with anxiety 7/29/2014    Heavy periods     Migraine 2008    with aura     Other acne 8/25/2014    Tachycardia        Past Surgical History:   Procedure Laterality Date    PATIENT DENIES ANY SURGICAL HISTORY           REVIEW OF SYSTEMS    System Neg/Pos Details   Constitutional Negative Fatigue, fever and weight loss.   ENMT Negative Drooling.   Eyes Negative Blurred vision and vision changes.   Respiratory Negative Dyspnea and wheezing.   Cardio Negative Chest pain, irregular heartbeat/palpitations and syncope.   GI Negative Abdominal pain and diarrhea.   Endocrine Negative Cold intolerance and heat intolerance.   Neuro Negative Tremors.   Psych Negative  Anxiety and depression.   Integumentary Negative Frequent skin infections, pigment change and rash.   Hema/Lymph Negative Easy bleeding and easy bruising.           PHYSICAL EXAM    LMP 05/09/2023        Constitutional Normal Overall appearance - Normal.   Psychiatric Normal Orientation - Oriented to time, place, person & situation. Appropriate mood and affect.   Neck Exam Normal Inspection - Normal. Palpation - Normal. Parotid gland - Normal. Thyroid gland - Normal.   Eyes Normal Conjunctiva - Right: Normal, Left: Normal. Pupil - Right: Normal, Left: Normal. Fundus - Right: Normal, Left: Normal.   Neurological Normal Memory - Normal. Cranial nerves - Cranial nerves II through XII grossly intact.   Head/Face Normal Facial features - Normal. Eyebrows - Normal. Skull - Normal.        Nasopharynx Normal External nose - Normal. Lips/teeth/gums - Normal. Tonsils - Normal. Oropharynx - Normal.   Ears Normal Inspection - Right: Normal, Left: Normal. Canal - Right: Normal, Left: Normal. TM - Right: Normal, Left: Normal.   Skin Normal Inspection -subcentimeter right epidermal facial cyst        Lymph Detail Normal Submental. Submandibular. Anterior cervical. Posterior cervical. Supraclavicular.        Nose/Mouth/Throat Normal External nose - Normal. Lips/teeth/gums - Normal. Tonsils - Normal. Oropharynx - Normal.   Nose/Mouth/Throat Normal Nares - Right: Normal Left: Normal. Septum -Normal  Turbinates - Right: Normal, Left: Normal.       Current Outpatient Medications:     Misc. Devices Does not apply Misc, Compound 6.5% dapsone, clindamycin 1 % and tretinoin 0.025%. 45g. Use pea sized amount to entire face once nightly., Disp: 1 each, Rfl: 11    spironolactone 100 MG Oral Tab, Take 1 tablet (100 mg total) by mouth daily., Disp: 90 tablet, Rfl: 3    escitalopram 10 MG Oral Tab, Take 1 tablet (10 mg total) by mouth daily., Disp: 90 tablet, Rfl: 1    lamoTRIgine 100 MG Oral Tab, Take 1 tablet (100 mg total) by mouth daily.,  Disp: , Rfl:     Rizatriptan Benzoate 10 MG Oral Tab, Take 1 tablet (10 mg total) by mouth as needed for Migraine. May repeat dose in 2 hours.  Maximum dose 2 tablets in 24 hours., Disp: 9 tablet, Rfl: 1    methylPREDNISolone (MEDROL) 4 MG Oral Tablet Therapy Pack, As directed. (Patient not taking: Reported on 1/22/2024), Disp: 21 each, Rfl: 0  ASSESSMENT AND PLAN    1. Epidermal cyst of face  Small subcentimeter epidermal cyst of the right cheek.  I did recommend excision under local anesthesia with reconstruction.  She understands the risk of poor cosmesis and a very minimal risk of recurrence.  She accepts these risks and wishes to proceed        This note was prepared using Dragon Medical voice recognition dictation software. As a result errors may occur. When identified these errors have been corrected. While every attempt is made to correct errors during dictation discrepancies may still exist    Rohan Yoon MD    1/29/2024    6:53 PM

## 2024-02-01 ENCOUNTER — OFFICE VISIT (OUTPATIENT)
Dept: FAMILY MEDICINE CLINIC | Facility: CLINIC | Age: 29
End: 2024-02-01

## 2024-02-01 VITALS
BODY MASS INDEX: 28.77 KG/M2 | RESPIRATION RATE: 18 BRPM | WEIGHT: 179 LBS | DIASTOLIC BLOOD PRESSURE: 81 MMHG | HEIGHT: 66 IN | HEART RATE: 79 BPM | SYSTOLIC BLOOD PRESSURE: 119 MMHG

## 2024-02-01 DIAGNOSIS — M79.674 GREAT TOE PAIN, RIGHT: Primary | ICD-10-CM

## 2024-02-01 PROCEDURE — 99213 OFFICE O/P EST LOW 20 MIN: CPT | Performed by: FAMILY MEDICINE

## 2024-02-01 RX ORDER — INDOMETHACIN 25 MG/1
25 CAPSULE ORAL
Qty: 30 CAPSULE | Refills: 0 | Status: SHIPPED | OUTPATIENT
Start: 2024-02-01 | End: 2024-02-11

## 2024-02-01 NOTE — PROGRESS NOTES
Subjective:   Patient ID: Katy Polo is a 28 year old female.    Toe Pain  This is a new problem. The current episode started more than 1 month ago. The problem occurs daily. The problem has been waxing and waning. Pertinent negatives include no fever or rash.       History/Other:   Review of Systems   Constitutional:  Negative for fever.   Skin:  Negative for rash.     Current Outpatient Medications   Medication Sig Dispense Refill    indomethacin 25 MG Oral Cap Take 1 capsule (25 mg total) by mouth 3 (three) times daily with meals for 10 days. 30 capsule 0    Misc. Devices Does not apply Misc Compound 6.5% dapsone, clindamycin 1 % and tretinoin 0.025%. 45g. Use pea sized amount to entire face once nightly. 1 each 11    spironolactone 100 MG Oral Tab Take 1 tablet (100 mg total) by mouth daily. 90 tablet 3    escitalopram 10 MG Oral Tab Take 1 tablet (10 mg total) by mouth daily. 90 tablet 1    lamoTRIgine 100 MG Oral Tab Take 1 tablet (100 mg total) by mouth daily.      Rizatriptan Benzoate 10 MG Oral Tab Take 1 tablet (10 mg total) by mouth as needed for Migraine. May repeat dose in 2 hours.  Maximum dose 2 tablets in 24 hours. 9 tablet 1     Allergies:No Known Allergies    Objective:   Physical Exam  Constitutional:       Appearance: Normal appearance.   Pulmonary:      Effort: Pulmonary effort is normal.   Musculoskeletal:      Comments: Right great toe without deformity.  Full range of motion.  Some discomfort with flexion.  No focal tenderness or warmth.   Neurological:      Mental Status: She is alert.         Assessment & Plan:   1. Great toe pain, right    Burning pain dorsal and medial aspects of right great toe started 3 to 4 months ago, generally occurs when resting in the evening.  She coaches softball, often in squatting position.  No numbness, weakness, back pain, sciatic pain.  On exam no focal tenderness, warmth or erythema.  Pain provoked with toe extension.  Rule out extensor hallucis  tendinitis, turf toe, neuroma.  Recommend avoid painful activities, indomethacin 3 times daily x 10 days reviewed instructions and side effects.  Ice 3 times daily x 10 minutes.  If no improvement in 1 to 2 weeks recommend podiatry evaluation.    No orders of the defined types were placed in this encounter.      Meds This Visit:  Requested Prescriptions     Signed Prescriptions Disp Refills    indomethacin 25 MG Oral Cap 30 capsule 0     Sig: Take 1 capsule (25 mg total) by mouth 3 (three) times daily with meals for 10 days.       Imaging & Referrals:  PODIATRY - INTERNAL

## 2024-02-06 ENCOUNTER — TELEPHONE (OUTPATIENT)
Dept: OTOLARYNGOLOGY | Facility: CLINIC | Age: 29
End: 2024-02-06

## 2024-02-06 DIAGNOSIS — L72.0 EPIDERMAL CYST OF FACE: Primary | ICD-10-CM

## 2024-02-20 ENCOUNTER — OFFICE VISIT (OUTPATIENT)
Dept: PODIATRY CLINIC | Facility: CLINIC | Age: 29
End: 2024-02-20
Payer: MEDICAID

## 2024-02-20 ENCOUNTER — HOSPITAL ENCOUNTER (OUTPATIENT)
Dept: GENERAL RADIOLOGY | Facility: HOSPITAL | Age: 29
Discharge: HOME | End: 2024-02-20
Attending: STUDENT IN AN ORGANIZED HEALTH CARE EDUCATION/TRAINING PROGRAM
Payer: MEDICAID

## 2024-02-20 DIAGNOSIS — M79.2 NEURITIS: ICD-10-CM

## 2024-02-20 DIAGNOSIS — M79.674 TOE PAIN, RIGHT: Primary | ICD-10-CM

## 2024-02-20 DIAGNOSIS — M79.674 TOE PAIN, RIGHT: ICD-10-CM

## 2024-02-20 PROCEDURE — 99203 OFFICE O/P NEW LOW 30 MIN: CPT | Performed by: STUDENT IN AN ORGANIZED HEALTH CARE EDUCATION/TRAINING PROGRAM

## 2024-02-20 PROCEDURE — 73660 X-RAY EXAM OF TOE(S): CPT | Performed by: STUDENT IN AN ORGANIZED HEALTH CARE EDUCATION/TRAINING PROGRAM

## 2024-02-20 NOTE — PROGRESS NOTES
[facility] Podiatry  Progress Note      Katy Polo is a 29 year old female.   Chief Complaint   Patient presents with    Toe Pain     Consult - right great toe - last couple months hurting at the joint and on the side, feels sore when running, burning feeling happens occasionally when at rest - is a catcher/ for softball - does not hurt while in catching position - pain rated 6/10             HPI:     Patient is a pleasant 29-year-old female who admits to performing fitness at [employer] and is also a  for softball team.  She admits that for the past 3 to 4 months her right first metatarsal joint and proximal to the joint has been hurting.  Denies any injuries or trauma following left axial    Allergies: Patient has no known allergies.    Current Outpatient Medications   Medication Sig Dispense Refill    Misc. Devices Does not apply Misc Compound 6.5% dapsone, clindamycin 1 % and tretinoin 0.025%. 45g. Use pea sized amount to entire face once nightly. 1 each 11    spironolactone 100 MG Oral Tab Take 1 tablet (100 mg total) by mouth daily. 90 tablet 3    escitalopram 10 MG Oral Tab Take 1 tablet (10 mg total) by mouth daily. 90 tablet 1    lamoTRIgine 100 MG Oral Tab Take 1 tablet (100 mg total) by mouth daily.      Rizatriptan Benzoate 10 MG Oral Tab Take 1 tablet (10 mg total) by mouth as needed for Migraine. May repeat dose in 2 hours.  Maximum dose 2 tablets in 24 hours. 9 tablet 1      Past Medical History:   Diagnosis Date    Acne     Anxiety state     Depression with anxiety 07/29/2014    Heavy periods     Migraine 2008    with aura     Migraines     Other acne 08/25/2014    Tachycardia       Past Surgical History:   Procedure Laterality Date    PATIENT DENIES ANY SURGICAL HISTORY        Family History   Problem Relation Age of Onset    Hypertension Mother     Obesity Mother     Heart Disorder Maternal Grandfather     Cancer Maternal Grandfather     Skin cancer Maternal Grandfather      Prostate Cancer Father       Social History     Socioeconomic History    Marital status: Single   Occupational History    Occupation:     Tobacco Use    Smoking status: Never    Smokeless tobacco: Never   Vaping Use    Vaping Use: Never used   Substance and Sexual Activity    Alcohol use: No    Drug use: No    Sexual activity: Never     Comment: never been sexual active    Other Topics Concern    Pt has a pacemaker No    Pt has a defibrillator No    Reaction to local anesthetic No    Blood Transfusions No           REVIEW OF SYSTEMS:     Denies nause, fever, chills  No calf pain  Denies chest pain or SOB      EXAM:   [facility] 02/01/2024   GENERAL: well developed, well nourished, in no apparent distress  EXTREMITIES:   1. Integument: Normal skin temperature and turgor  2. Vascular: Dorsalis pedis two out of four bilateral and posterior tibial pulses two out of   four bilateral, capillary refill normal.   3. Musculoskeletal: All muscle groups are graded 5 out of 5 in the foot and ankle.  mild right hallux valgus present   4. Neurological: Normal sharp dull sensation; reflexes normal.             ASSESSMENT AND PLAN:   Diagnoses and all orders for this visit:    Toe pain, right  -     XR TOE(S) (MIN 2 VIEWS), RIGHT 1ST (CPT=73660); Future    Neuritis        Plan:     Patient seen and examined and findings discussed with patient.  Discussed and reviewed right foot x-rays with patient.  Discussed etiology of condition along with treatment options.  Advised patient to avoid any lower extremity activities for the duration of 1 month and if symptoms persist or worsen then we could perform a cortisone injection at the area of the first MPJ. RTC in 1 month      The patient indicates understanding of these issues and agrees to the plan.        Mimi Lobo DPM

## 2024-02-21 NOTE — DISCHARGE INSTRUCTIONS
Discharge Instructions    Keep dressing clean, dry and intact    2.   You can shower    3.  Take Tylenol or Advil for any pain or discomfort    4.  Follow up in 2 weeks with Dr Yoon

## 2024-02-26 ENCOUNTER — HOSPITAL ENCOUNTER (OUTPATIENT)
Facility: HOSPITAL | Age: 29
Setting detail: HOSPITAL OUTPATIENT SURGERY
Discharge: HOME | End: 2024-02-26
Attending: OTOLARYNGOLOGY | Admitting: OTOLARYNGOLOGY
Payer: MEDICAID

## 2024-02-26 VITALS — DIASTOLIC BLOOD PRESSURE: 70 MMHG | HEART RATE: 72 BPM | SYSTOLIC BLOOD PRESSURE: 130 MMHG

## 2024-02-26 DIAGNOSIS — L72.0 EPIDERMAL CYST OF FACE: Primary | ICD-10-CM

## 2024-02-26 PROCEDURE — 12051 INTMD RPR FACE/MM 2.5 CM/<: CPT | Performed by: OTOLARYNGOLOGY

## 2024-02-26 PROCEDURE — 0HB1XZZ EXCISION OF FACE SKIN, EXTERNAL APPROACH: ICD-10-PCS | Performed by: OTOLARYNGOLOGY

## 2024-02-26 PROCEDURE — 11441 EXC FACE-MM B9+MARG 0.6-1 CM: CPT | Performed by: OTOLARYNGOLOGY

## 2024-02-26 RX ORDER — CEPHALEXIN 500 MG/1
500 CAPSULE ORAL EVERY 8 HOURS
Qty: 21 CAPSULE | Refills: 0 | Status: SHIPPED | OUTPATIENT
Start: 2024-02-26

## 2024-02-26 RX ORDER — LIDOCAINE HYDROCHLORIDE AND EPINEPHRINE 10; 10 MG/ML; UG/ML
INJECTION, SOLUTION INFILTRATION; PERINEURAL AS NEEDED
Status: DISCONTINUED | OUTPATIENT
Start: 2024-02-26 | End: 2024-02-26 | Stop reason: HOSPADM

## 2024-02-26 RX ORDER — HYDROCODONE BITARTRATE AND ACETAMINOPHEN 5; 325 MG/1; MG/1
1 TABLET ORAL EVERY 8 HOURS PRN
Qty: 6 TABLET | Refills: 0 | Status: SHIPPED | OUTPATIENT
Start: 2024-02-26

## 2024-02-26 RX ORDER — ONDANSETRON 2 MG/ML
4 INJECTION INTRAMUSCULAR; INTRAVENOUS EVERY 6 HOURS PRN
OUTPATIENT
Start: 2024-02-26

## 2024-02-26 RX ORDER — ONDANSETRON 4 MG/1
4 TABLET, ORALLY DISINTEGRATING ORAL EVERY 6 HOURS PRN
OUTPATIENT
Start: 2024-02-26

## 2024-02-26 NOTE — INTERVAL H&P NOTE
Pre-op Diagnosis: Epidermal cyst of face [L72.0]    The above referenced H&P was reviewed by Rohan Yoon MD on 2/26/2024, the patient was examined and no significant changes have occurred in the patient's condition since the H&P was performed.  I discussed with the patient and/or legal representative the potential benefits, risks and side effects of this procedure; the likelihood of the patient achieving goals; and potential problems that might occur during recuperation.  I discussed reasonable alternatives to the procedure, including risks, benefits and side effects related to the alternatives and risks related to not receiving this procedure.  We will proceed with procedure as planned.

## 2024-02-26 NOTE — OPERATIVE REPORT
Preoperative diagnosis: Right face cyst 0.8 cm    Postoperative diagnosis: Same    Procedure: #1 excision of 0.8 cm right face cyst, #2 intermediate layered closure 1.5 cm    Surgeon: Rohan Yoon MD    Anesthesia: 1% Xylocaine with 1-100,000 of epinephrine    EBL: Less than 2 mL    Date of service: 2/26/2024    Indications: Patient presents with a history of enlarging cystic structure of the right cheek.  Surgical excision is indicated.    Procedure: Patient was taken operating room placed in supine position following the infiltration of the lesion with 1% Xylocaine with 1-100,000 of epinephrine the patient was prepped and draped in regular fashion.  A 15 blade was used to create a fusiform 1.5 cm incision along the natural skin crease allowing for remove the cyst onto the underlying subcutaneous fatty tissues.  Any bleeding sites were controlled using bipolar cautery.  The excised lesion was then sent in formalin to pathology.  The intermediate closure was performed by approximating the deep subcutaneous tissues which were then closed using a 5-0 PDS individual stitches.  The skin edges were approximated everted and closed using a running locked 5-0 fast-absorbing gut stitch.  The wound was cleaned and a tape and benzoin dressing was placed.  The patient was then taken to recovery room without any complications from procedure.  EBL was less than 2 mL.

## 2024-02-27 ENCOUNTER — TELEPHONE (OUTPATIENT)
Dept: OTOLARYNGOLOGY | Facility: CLINIC | Age: 29
End: 2024-02-27

## 2024-02-27 NOTE — TELEPHONE ENCOUNTER
Post op day #1 excision & closure of right face cyst    Per patient, is feeling well, eating and drinking well,  Has minimal pain, no drainage noted, dressing is clean and dry, afebrile, no bleeding.  Reinforced  post op instructions as follows: patient to Keep dressing clean, dry and intact, patient can shower,Take Tylenol or Advil for any pain or discomfort, Follow up in 1 weeks with Dr Yoon. Postop appointment made.  Future Appointments   Date Time Provider Department [place]   3/5/2024  1:20 PM Rohan Yoon MD [facility]   3/7/2024  1:45 PM Lidia Garza MD [facility]   3/21/2024  8:50 AM Mimi Lobo DPM [facility]   4/24/2024 10:00 AM Kiel Dee MD [facility]

## 2024-03-05 ENCOUNTER — OFFICE VISIT (OUTPATIENT)
Dept: OTOLARYNGOLOGY | Facility: CLINIC | Age: 29
End: 2024-03-05

## 2024-03-05 VITALS — BODY MASS INDEX: 28.77 KG/M2 | HEIGHT: 66 IN | WEIGHT: 179 LBS | TEMPERATURE: 98 F

## 2024-03-05 DIAGNOSIS — L72.0 EPIDERMAL CYST OF FACE: Primary | ICD-10-CM

## 2024-03-05 PROCEDURE — 99024 POSTOP FOLLOW-UP VISIT: CPT | Performed by: OTOLARYNGOLOGY

## 2024-03-05 NOTE — PROGRESS NOTES
Katy Polo is a 29 year old female.    Chief Complaint   Patient presents with    Post-Op     2/26/24 Excision and closure of right face cyst, sutures removed.       HISTORY OF PRESENT ILLNESS  She presents with a small lesion of her right cheek which has been getting bigger in size over time.  Does not drain nothing comes out of it does not cause her pain but sticks out.  She uses make-up on it to hide it looks even worse.  Seen by dermatologist and sent by  for my opinion regarding management of this lesion.     3/5/24 doing very well 8 days out from excision of a cheek cyst on the right.  Here to have sutures removed.  Path revealed a benign epidermal cyst.      Social History     Socioeconomic History    Marital status: Single   Occupational History    Occupation:     Tobacco Use    Smoking status: Never    Smokeless tobacco: Never   Vaping Use    Vaping Use: Never used   Substance and Sexual Activity    Alcohol use: No    Drug use: No    Sexual activity: Never     Comment: never been sexual active    Other Topics Concern    Pt has a pacemaker No    Pt has a defibrillator No    Reaction to local anesthetic No    Blood Transfusions No       Family History   Problem Relation Age of Onset    Hypertension Mother     Obesity Mother     Heart Disorder Maternal Grandfather     Cancer Maternal Grandfather     Skin cancer Maternal Grandfather     Prostate Cancer Father        Past Medical History:   Diagnosis Date    Acne     Anxiety state     Depression with anxiety 07/29/2014    Heavy periods     Migraine 2008    with aura     Migraines     Other acne 08/25/2014    Tachycardia        Past Surgical History:   Procedure Laterality Date    PATIENT DENIES ANY SURGICAL HISTORY           REVIEW OF SYSTEMS    System Neg/Pos Details   Constitutional Negative Fatigue, fever and weight loss.   ENMT Negative Drooling.   Eyes Negative Blurred vision and vision changes.   Respiratory Negative Dyspnea and  wheezing.   Cardio Negative Chest pain, irregular heartbeat/palpitations and syncope.   GI Negative Abdominal pain and diarrhea.   Endocrine Negative Cold intolerance and heat intolerance.   Neuro Negative Tremors.   Psych Negative Anxiety and depression.   Integumentary Negative Frequent skin infections, pigment change and rash.   Hema/Lymph Negative Easy bleeding and easy bruising.           PHYSICAL EXAM    Temp 97.6 °F (36.4 °C) (Tympanic)   Ht 5' 6\" (1.676 m)   Wt 179 lb (81.2 kg)   LMP 02/01/2024   BMI 28.89 kg/m²        Constitutional Normal Overall appearance - Normal.   Psychiatric Normal Orientation - Oriented to time, place, person & situation. Appropriate mood and affect.   Neck Exam Normal Inspection - Normal. Palpation - Normal. Parotid gland - Normal. Thyroid gland - Normal.   Eyes Normal Conjunctiva - Right: Normal, Left: Normal. Pupil - Right: Normal, Left: Normal. Fundus - Right: Normal, Left: Normal.   Neurological Normal Memory - Normal. Cranial nerves - Cranial nerves II through XII grossly intact.   Head/Face Normal Facial features - Normal. Eyebrows - Normal. Skull - Normal.        Nasopharynx Normal External nose - Normal. Lips/teeth/gums - Normal. Tonsils - Normal. Oropharynx - Normal.   Ears Normal Inspection - Right: Normal, Left: Normal. Canal - Right: Normal, Left: Normal. TM - Right: Normal, Left: Normal.   Skin Normal Inspection - Normal.        Lymph Detail Normal Submental. Submandibular. Anterior cervical. Posterior cervical. Supraclavicular.   Incision  Clean dry and intact   Nose/Mouth/Throat Normal External nose - Normal. Lips/teeth/gums - Normal. Tonsils - Normal. Oropharynx - Normal.   Nose/Mouth/Throat Normal Nares - Right: Normal Left: Normal. Septum -Normal  Turbinates - Right: Normal, Left: Normal.       Current Outpatient Medications:     cephalexin 500 MG Oral Cap, Take 1 capsule (500 mg total) by mouth every 8 (eight) hours., Disp: 21 capsule, Rfl: 0     HYDROcodone-acetaminophen (NORCO) 5-325 MG Oral Tab, Take 1 tablet by mouth every 8 (eight) hours as needed., Disp: 6 tablet, Rfl: 0    Misc. Devices Does not apply Misc, Compound 6.5% dapsone, clindamycin 1 % and tretinoin 0.025%. 45g. Use pea sized amount to entire face once nightly., Disp: 1 each, Rfl: 11    spironolactone 100 MG Oral Tab, Take 1 tablet (100 mg total) by mouth daily., Disp: 90 tablet, Rfl: 3    escitalopram 10 MG Oral Tab, Take 1 tablet (10 mg total) by mouth daily., Disp: 90 tablet, Rfl: 1    lamoTRIgine 100 MG Oral Tab, Take 1 tablet (100 mg total) by mouth daily., Disp: , Rfl:     Rizatriptan Benzoate 10 MG Oral Tab, Take 1 tablet (10 mg total) by mouth as needed for Migraine. May repeat dose in 2 hours.  Maximum dose 2 tablets in 24 hours., Disp: 9 tablet, Rfl: 1  ASSESSMENT AND PLAN    1. Epidermal cyst of face  Doing very well sutures and dressings removed.  Healing nicely wound care discussed and understood.  I did recommend Silastic bandages to the affected area.  Return to see me as needed        This note was prepared using Dragon Medical voice recognition dictation software. As a result errors may occur. When identified these errors have been corrected. While every attempt is made to correct errors during dictation discrepancies may still exist    Rohan Yoon MD    3/5/2024    2:10 PM     Niacinamide Counseling: I recommended taking niacin or niacinamide, also know as vitamin B3, twice daily. Recent evidence suggests that taking vitamin B3 (500 mg twice daily) can reduce the risk of actinic keratoses and non-melanoma skin cancers. Side effects of vitamin B3 include flushing and headache.

## 2024-03-07 ENCOUNTER — LAB ENCOUNTER (OUTPATIENT)
Dept: LAB | Age: 29
End: 2024-03-07
Attending: FAMILY MEDICINE
Payer: MEDICAID

## 2024-03-07 ENCOUNTER — OFFICE VISIT (OUTPATIENT)
Dept: FAMILY MEDICINE CLINIC | Facility: CLINIC | Age: 29
End: 2024-03-07
Payer: MEDICAID

## 2024-03-07 VITALS
DIASTOLIC BLOOD PRESSURE: 81 MMHG | BODY MASS INDEX: 28.74 KG/M2 | RESPIRATION RATE: 18 BRPM | HEIGHT: 66 IN | WEIGHT: 178.81 LBS | SYSTOLIC BLOOD PRESSURE: 133 MMHG | HEART RATE: 93 BPM

## 2024-03-07 DIAGNOSIS — F40.10 SOCIAL ANXIETY DISORDER: ICD-10-CM

## 2024-03-07 DIAGNOSIS — L70.8 OTHER ACNE: ICD-10-CM

## 2024-03-07 DIAGNOSIS — Z00.00 ROUTINE PHYSICAL EXAMINATION: ICD-10-CM

## 2024-03-07 DIAGNOSIS — Z00.00 ROUTINE PHYSICAL EXAMINATION: Primary | ICD-10-CM

## 2024-03-07 DIAGNOSIS — H53.19 VISUAL SNOW SYNDROME: ICD-10-CM

## 2024-03-07 DIAGNOSIS — G43.109 MIGRAINE WITH AURA AND WITHOUT STATUS MIGRAINOSUS, NOT INTRACTABLE: ICD-10-CM

## 2024-03-07 DIAGNOSIS — R42 DIZZY SPELLS: ICD-10-CM

## 2024-03-07 LAB
ANION GAP SERPL CALC-SCNC: 1 MMOL/L (ref 0–18)
BUN BLD-MCNC: 17 MG/DL (ref 9–23)
BUN/CREAT SERPL: 18.1 (ref 10–20)
CALCIUM BLD-MCNC: 9.9 MG/DL (ref 8.7–10.4)
CHLORIDE SERPL-SCNC: 109 MMOL/L (ref 98–112)
CHOLEST SERPL-MCNC: 207 MG/DL (ref ?–200)
CO2 SERPL-SCNC: 28 MMOL/L (ref 21–32)
CREAT BLD-MCNC: 0.94 MG/DL
DEPRECATED RDW RBC AUTO: 41.8 FL (ref 35.1–46.3)
EGFRCR SERPLBLD CKD-EPI 2021: 84 ML/MIN/1.73M2 (ref 60–?)
ERYTHROCYTE [DISTWIDTH] IN BLOOD BY AUTOMATED COUNT: 12.8 % (ref 11–15)
FASTING PATIENT LIPID ANSWER: NO
FASTING STATUS PATIENT QL REPORTED: NO
GLUCOSE BLD-MCNC: 84 MG/DL (ref 70–99)
HCT VFR BLD AUTO: 41.4 %
HDLC SERPL-MCNC: 45 MG/DL (ref 40–59)
HGB BLD-MCNC: 13.4 G/DL
LDLC SERPL CALC-MCNC: 135 MG/DL (ref ?–100)
MCH RBC QN AUTO: 29.2 PG (ref 26–34)
MCHC RBC AUTO-ENTMCNC: 32.4 G/DL (ref 31–37)
MCV RBC AUTO: 90.2 FL
NONHDLC SERPL-MCNC: 162 MG/DL (ref ?–130)
OSMOLALITY SERPL CALC.SUM OF ELEC: 287 MOSM/KG (ref 275–295)
PLATELET # BLD AUTO: 313 10(3)UL (ref 150–450)
POTASSIUM SERPL-SCNC: 4.1 MMOL/L (ref 3.5–5.1)
RBC # BLD AUTO: 4.59 X10(6)UL
SODIUM SERPL-SCNC: 138 MMOL/L (ref 136–145)
TRIGL SERPL-MCNC: 149 MG/DL (ref 30–149)
VLDLC SERPL CALC-MCNC: 27 MG/DL (ref 0–30)
WBC # BLD AUTO: 7.9 X10(3) UL (ref 4–11)

## 2024-03-07 PROCEDURE — 80061 LIPID PANEL: CPT

## 2024-03-07 PROCEDURE — 36415 COLL VENOUS BLD VENIPUNCTURE: CPT

## 2024-03-07 PROCEDURE — 85027 COMPLETE CBC AUTOMATED: CPT

## 2024-03-07 PROCEDURE — 99395 PREV VISIT EST AGE 18-39: CPT | Performed by: FAMILY MEDICINE

## 2024-03-07 PROCEDURE — 80048 BASIC METABOLIC PNL TOTAL CA: CPT

## 2024-03-07 RX ORDER — MELATONIN
1000 WEEKLY
COMMUNITY

## 2024-03-07 NOTE — PROGRESS NOTES
Subjective:   Patient ID: Katy Polo is a 29 year old female.    Patient presents for routine physical and evaluation of dizziness as below.    Dizziness  Associated symptoms include headaches.   Menstrual Problem  Associated symptoms include headaches.       History/Other:   Review of Systems   Constitutional: Negative.    Eyes:  Positive for visual disturbance.   Respiratory: Negative.     Cardiovascular: Negative.    Gastrointestinal: Negative.    Genitourinary:  Positive for menstrual problem.   Skin: Negative.    Neurological:  Positive for dizziness and headaches.     Current Outpatient Medications   Medication Sig Dispense Refill    cyanocobalamin 1000 MCG Oral Tab Take 1 tablet (1,000 mcg total) by mouth once a week.      Misc. Devices Does not apply Misc Compound 6.5% dapsone, clindamycin 1 % and tretinoin 0.025%. 45g. Use pea sized amount to entire face once nightly. 1 each 11    spironolactone 100 MG Oral Tab Take 1 tablet (100 mg total) by mouth daily. 90 tablet 3    escitalopram 10 MG Oral Tab Take 1 tablet (10 mg total) by mouth daily. 90 tablet 1    lamoTRIgine 100 MG Oral Tab Take 1 tablet (100 mg total) by mouth daily.      Rizatriptan Benzoate 10 MG Oral Tab Take 1 tablet (10 mg total) by mouth as needed for Migraine. May repeat dose in 2 hours.  Maximum dose 2 tablets in 24 hours. 9 tablet 1     Allergies:No Known Allergies    Objective:   Physical Exam  Constitutional:       Appearance: Normal appearance.   Cardiovascular:      Rate and Rhythm: Normal rate and regular rhythm.      Heart sounds: Normal heart sounds.   Pulmonary:      Effort: Pulmonary effort is normal.      Breath sounds: Normal breath sounds.   Chest:   Breasts:     Right: Normal. No mass.      Left: Normal. No mass.   Abdominal:      Palpations: Abdomen is soft. There is no mass.      Tenderness: There is no abdominal tenderness.   Lymphadenopathy:      Cervical: No cervical adenopathy.      Upper Body:      Right upper body:  No axillary adenopathy.      Left upper body: No axillary adenopathy.   Skin:     General: Skin is warm and dry.   Neurological:      General: No focal deficit present.      Mental Status: She is alert and oriented to person, place, and time.      Cranial Nerves: No cranial nerve deficit.      Comments: Melbourne-Hallpike negative.         Assessment & Plan:   1. Routine physical examination-patient is single, lives alone.  Attracted to men.  Coaching softball, senior songwriter.  Menses every 2 to 3 weeks, moderate flow, no spotting.  Suspect irregularity related to spironolactone.  Recently saw podiatry for great toe pain, plan of care to avoid Hardeman theory.  Discussed STD prevention.  Declines contraceptive medication at this time.  Pap smear up-to-date  Nonfasting labs today   2. Dizzy spells-patient describes dizzy spells that last approximately 10 to 30 minutes and occur 1-2 times per week.  Not associated with tinnitus, headache, position change, nausea or vomiting.  No nystagmus.  Mel[place]-Hallpike negative.  No TMJ tenderness.  Suspect vestibular migraines.  Has upcoming appointment with neurology as below.   3. Migraine with aura and without status migrainosus, not intractable-had started lamotrigine last year for visual snow syndrome.  Not helpful for this, but has decreased frequency of migraines.   4. Visual snow syndrome-ongoing symptoms.  She has seen neuro-ophthalmologist.  Has upcoming appointment with [facility] neurologist to assess nonspecific white matter changes on MRI.   5. Other acne-continuing spironolactone.   6. Social anxiety disorder-stable on Lexapro.       Orders Placed This Encounter   Procedures    Lipid Panel [E]    Basic Metabolic Panel (8) [E]    CBC, Platelet, No Differential [E]       Meds This Visit:  Requested Prescriptions      No prescriptions requested or ordered in this encounter       Imaging & Referrals:  None

## 2024-03-21 ENCOUNTER — OFFICE VISIT (OUTPATIENT)
Dept: PODIATRY CLINIC | Facility: CLINIC | Age: 29
End: 2024-03-21

## 2024-03-21 DIAGNOSIS — M19.071 OSTEOARTHRITIS OF FIRST METATARSOPHALANGEAL (MTP) JOINT OF RIGHT FOOT: Primary | ICD-10-CM

## 2024-03-21 PROCEDURE — 20600 DRAIN/INJ JOINT/BURSA W/O US: CPT | Performed by: STUDENT IN AN ORGANIZED HEALTH CARE EDUCATION/TRAINING PROGRAM

## 2024-03-21 RX ORDER — DEXAMETHASONE SODIUM PHOSPHATE 4 MG/ML
4 VIAL (ML) INJECTION ONCE
Status: COMPLETED | OUTPATIENT
Start: 2024-03-21 | End: 2024-03-21

## 2024-03-21 RX ORDER — TRIAMCINOLONE ACETONIDE 40 MG/ML
40 INJECTION, SUSPENSION INTRA-ARTICULAR; INTRAMUSCULAR ONCE
Status: COMPLETED | OUTPATIENT
Start: 2024-03-21 | End: 2024-03-21

## 2024-03-21 RX ADMIN — DEXAMETHASONE SODIUM PHOSPHATE 4 MG: 4 MG/ML VIAL (ML) INJECTION at 13:56:00

## 2024-03-21 RX ADMIN — TRIAMCINOLONE ACETONIDE 40 MG: 40 INJECTION, SUSPENSION INTRA-ARTICULAR; INTRAMUSCULAR at 13:57:00

## 2024-03-21 NOTE — PROGRESS NOTES
Verbal order per Dr Villalta, draw up 1ml of 1% Lidiocaine, 1ml of Dexamethasone Sodium Phosphate and 1ml of Kenalog 40, for a right hallux.

## 2024-03-21 NOTE — PROGRESS NOTES
[facility] Podiat[facility]  Progress Note      Katy Polo is a 29 year old female.   Chief Complaint   Patient presents with    Toe Pain     R toe f/u -  Pt states still having pain - burning sensation at times.              HPI:     Patient is a pleasant 29-year-old female presents to clinic today for follow-up of right first MPJ pain.  She still admits to pain with walking and pressure.  Patient would like to proceed with cortisone injection as discussed on last visit.    Allergies: Patient has no known allergies.    Current Outpatient Medications   Medication Sig Dispense Refill    cyanocobalamin 1000 MCG Oral Tab Take 1 tablet (1,000 mcg total) by mouth once a week.      Misc. Devices Does not apply Misc Compound 6.5% dapsone, clindamycin 1 % and tretinoin 0.025%. 45g. Use pea sized amount to entire face once nightly. 1 each 11    spironolactone 100 MG Oral Tab Take 1 tablet (100 mg total) by mouth daily. 90 tablet 3    escitalopram 10 MG Oral Tab Take 1 tablet (10 mg total) by mouth daily. 90 tablet 1    lamoTRIgine 100 MG Oral Tab Take 1 tablet (100 mg total) by mouth daily.      Rizatriptan Benzoate 10 MG Oral Tab Take 1 tablet (10 mg total) by mouth as needed for Migraine. May repeat dose in 2 hours.  Maximum dose 2 tablets in 24 hours. 9 tablet 1      Past Medical History:   Diagnosis Date    Acne     Anxiety state     Depression with anxiety 07/29/2014    Heavy periods     Migraine 2008    with aura     Migraines     Other acne 08/25/2014    Tachycardia       Past Surgical History:   Procedure Laterality Date    PATIENT DENIES ANY SURGICAL HISTORY        Family History   Problem Relation Age of Onset    Hypertension Mother     Obesity Mother     Heart Disorder Maternal Grandfather     Cancer Maternal Grandfather     Skin cancer Maternal Grandfather     Prostate Cancer Father       Social History     Socioeconomic History    Marital status: Single   Occupational History    Occupation:      Tobacco Use    Smoking status: Never    Smokeless tobacco: Never   Vaping Use    Vaping Use: Never used   Substance and Sexual Activity    Alcohol use: No    Drug use: No    Sexual activity: Never     Comment: never been sexual active    Other Topics Concern    Pt has a pacemaker No    Pt has a defibrillator No    Reaction to local anesthetic No    Blood Transfusions No           REVIEW OF SYSTEMS:     Denies nause, fever, chills  No calf pain  Denies chest pain or SOB      EXAM:   [facility] 02/21/2024   GENERAL: well developed, well nourished, in no apparent distress  EXTREMITIES:   1. Integument: Normal skin temperature and turgor  2. Vascular: Dorsalis pedis two out of four bilateral and posterior tibial pulses two out of   four bilateral, capillary refill normal.   3. Musculoskeletal: All muscle groups are graded 5 out of 5 in the foot and ankle. Pain with palpation to right 1st MPJ. Mild discomfort with right 1st MPJ ROM   4. Neurological: Normal sharp dull sensation; reflexes normal.             ASSESSMENT AND PLAN:   Diagnoses and all orders for this visit:    Osteoarthritis of first metatarsophalangeal (MTP) joint of right foot  -     DME - External        Plan:     Patient seen and examined and findings discussed with patient.  Discussed right first MPJ osteoarthritis treatment options.  Continue external provided for custom orthotics with Shultz's extension.  Gross cortisone injection to right first MPJ which patient to proceed with.  After cleansing the right first MPJ area and injection containing 1 cc of 1% lidocaine, 1 cc of dexamethasone and 1 cc of Kenalog 40.  Patient tolerated injection well.  Advised patient of steroid flareup and advised her to ice the area as needed.  Return to clinic in 3 months for reevaluation    The patient indicates understanding of these issues and agrees to the plan.        Mimi Lobo DPM

## 2024-05-10 ENCOUNTER — OFFICE VISIT (OUTPATIENT)
Dept: NEUROLOGY | Facility: CLINIC | Age: 29
End: 2024-05-10
Payer: MEDICAID

## 2024-05-10 VITALS
HEART RATE: 93 BPM | DIASTOLIC BLOOD PRESSURE: 88 MMHG | WEIGHT: 180.19 LBS | BODY MASS INDEX: 29 KG/M2 | RESPIRATION RATE: 16 BRPM | SYSTOLIC BLOOD PRESSURE: 130 MMHG

## 2024-05-10 DIAGNOSIS — Q06.4 HYDROMYELIA (HCC): ICD-10-CM

## 2024-05-10 DIAGNOSIS — R33.9 INCOMPLETE BLADDER EMPTYING: ICD-10-CM

## 2024-05-10 DIAGNOSIS — H53.19 VISUAL SNOW SYNDROME: ICD-10-CM

## 2024-05-10 DIAGNOSIS — R42 VERTIGO: ICD-10-CM

## 2024-05-10 DIAGNOSIS — G43.109 MIGRAINE WITH AURA AND WITHOUT STATUS MIGRAINOSUS, NOT INTRACTABLE: ICD-10-CM

## 2024-05-10 DIAGNOSIS — R90.82 WHITE MATTER ABNORMALITY ON MRI OF BRAIN: Primary | ICD-10-CM

## 2024-05-10 PROCEDURE — 99417 PROLNG OP E/M EACH 15 MIN: CPT | Performed by: STUDENT IN AN ORGANIZED HEALTH CARE EDUCATION/TRAINING PROGRAM

## 2024-05-10 PROCEDURE — 99205 OFFICE O/P NEW HI 60 MIN: CPT | Performed by: STUDENT IN AN ORGANIZED HEALTH CARE EDUCATION/TRAINING PROGRAM

## 2024-05-10 NOTE — H&P
Neurology New Office Visit    Katy Polo   Date of Birth 2/15/1995    Subjective:  Katy Polo is a(n) 29 year old female with a medical history of migraine, visual snow, history of unexplained fevers and joint pain in past, and white matter lesions of brain who presents for white matter lesion monitoring.     Per patient, she was having some visual problems. Vision seems like a lot of stuff going on in eyes, told has visual snow, pixilated type picture. Neuro-ophthalmologist at [facility]. Did MRI, found some lesions. Had done a brain MRI four or so years before, had low grade fevers daily for three or so years, and some fatigue and brain fog. Felt a few more lesions compared to mri tin from years before. MRI brain 2023 stable from year before. Reports has had some trace sensory asymmetries in past with testing. Reports she has had random bouts of dizizness, fatigue and brain fog in the past. Now occassionally she will not feel great over a week. Prior history of low grade fevers got better spontaneously, she was really achy at the time. Has seen rheumatologists and immunologists. Started acupuncture, and then improved some with exercise. Now consistently exercising. If she does too much it gets worse. Currently has some dizziness and unsteadiness, recently will feel like she will fall over and room is spinning, other times       Neurologic review of systems:  -Cognition: some brain fog, trouble concentrating (started with autoimmune thing, now more intermittent)  -Fatigue: yes for years  -Depression/anxiety: has anxiety, takes meds, has a counselor  -Vision: visual snow throughout both eyes, pixilated vision, floaters, wears contacts. Gets migriane aura in one eye, could be either then migraine on other side.   -Dysphagia/dysarthria: no  -Motor: no ?trace right hand weakness intermittently see in coordination, right hand weakness off and on whole day occassionally, no trigger  -Sensory: some tingling in  fingers usually on one hand, could be either hand  -Ambulation: no  -Balance: sometimes one side better than other, falls to one side at orange theory, can't recall which side  -Bowel: no  -Bladder: no, usually feels fully empties bladder, sometimes takes awhile to void has been noticing more  -Lhermittes: no  -Reder crunch: no  -Dizziness: sometimes vertigo, sometimes disequilibrium without spinning, ongoing, random no positional component, last about a minute and self resolve. No associated aural fullness or ear ringing (at baseline sometimes one ear will get mute and then ringing will happen, separate from dizziness, very breif and self resolves). No diplopia associated with dizziness.   -Coordination: feels a bit clumsier (coaches softball, harder to catch a ball, and trouble holding things in hand sometimes, right hand)  -Headaches as below    -Vitamin D: 29, 2023. 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    Per  neurology note \"Katy Polo is a 28 y.o. female. She has a past medical history of Anxiety (2012), Headache (migraines starting in 2011), and Visual impairment (nearsighted since 2005, visual snow 2022). Patient has a history of a 3 year ([id number]) low-grade fever that has since resolved. She sought care at [facility], and was told that it was autoimmune in origin. Due to patients history with Visual Snow, patient underwent an MRI in Aug 2022 that was suspicious for MS. Patient describes symptoms for the past year of dizziness, numbness/tingling, fatigue at the end of the day, electrical shock sensation down the spine, urinary urgency, and dissociation. Additionally, patient reports feelings of difficulty grabbing items at the end of the day and heaviness in the limbs. Patient denied changes in bowel movements and hearing. She is currently on lamotrigine for her symptoms of Visual Snow, patient reports  that her symptoms have been stable. \" Per 4/2024 note from , lesions of tin felt consistent with migraine history. MRI T spine with small syrinx, reassuring exam. Planned to repeat neuroimaging.     Per  ophthalmology note, patientw iht visual snow syndrome. Lamictal helping with migraines, not with visual snow, advsied continuing lamictal.     Migraine and visual snow history:   -Onset ~13 years old  -Visual aura unilateral could be either eye, then migraine opposite side. Stabbing, photophobia and phonophobia, nauseas, vomiting. Some associated generalized weakness. No sensory sxs.   -Last for hours, needs to sleep  -Preventive: lamictal- helps with migraine frequency (used to have two per month, now one per year on lamotrigine)  -Abortive: rizatriptan usually helps    Per [facility] rheum note, daily fevers since 2016 after tick bite (~100), associated pruritic erythematous rash at site o fbite rue improved with time. Treatment with doxy,no imrpovement in fevers. Had mild arthralgias. Considered adult onset still disease or periodic fever syndrome. Labs- neg lyme, neg spep, low iga normal igm and igg, nl ck, nl crp, nl ferritin, elevated parvovirus igg nl igm, neg rf, neg ssa and ssb, nl c3 and c4, marichuy if nl. Esr nl. Pursued work-up, no follow up notes seen.     Cannot seem to open records from [place].     Problem List:  Patient Active Problem List   Diagnosis    Other acne    Social anxiety disorder    Chronic fatigue and malaise    Tachycardia    Migraine with aura    Encounter for surveillance of contraceptive pills    Dysmenorrhea    Visual snow syndrome    Epidermal cyst of face       PMHx:  Past Medical History:    Acne    Anxiety state    Depression with anxiety    Heavy periods    Migraine    with aura     Migraines    Other acne    Tachycardia       PSHx:  Past Surgical History:   Procedure Laterality Date    Patient denies any surgical history         SocHx:  Social History     Socioeconomic History     Marital status: Single   Occupational History    Occupation:     Tobacco Use    Smoking status: Never    Smokeless tobacco: Never   Vaping Use    Vaping status: Never Used   Substance and Sexual Activity    Alcohol use: No    Drug use: No    Sexual activity: Never     Comment: never been sexual active    Other Topics Concern    Pt has a pacemaker No    Pt has a defibrillator No    Reaction to local anesthetic No    Blood Transfusions No    Caffeine Concern Yes     Comment: once weekly    Exercise Yes   Social History Narrative    PATIENT DENIES ANY H/X abuse.     Pt lives with parents.    Pt is a .    Professional musician.     Family History:  Family History   Problem Relation Age of Onset    Hypertension Mother     Obesity Mother     Heart Disorder Maternal Grandfather     Cancer Maternal Grandfather     Skin cancer Maternal Grandfather     Prostate Cancer Father        Current Medications:  Current Outpatient Medications   Medication Sig Dispense Refill    cyanocobalamin 1000 MCG Oral Tab Take 1 tablet (1,000 mcg total) by mouth once a week.      Misc. Devices Does not apply Misc Compound 6.5% dapsone, clindamycin 1 % and tretinoin 0.025%. 45g. Use pea sized amount to entire face once nightly. 1 each 11    spironolactone 100 MG Oral Tab Take 1 tablet (100 mg total) by mouth daily. 90 tablet 3    escitalopram 10 MG Oral Tab Take 1 tablet (10 mg total) by mouth daily. 90 tablet 1    lamoTRIgine 100 MG Oral Tab Take 1 tablet (100 mg total) by mouth daily.      Rizatriptan Benzoate 10 MG Oral Tab Take 1 tablet (10 mg total) by mouth as needed for Migraine. May repeat dose in 2 hours.  Maximum dose 2 tablets in 24 hours. 9 tablet 1     Objective/Physical Exam:    Vital Signs:  Blood pressure 130/88, pulse 93, resp. rate 16, weight 180 lb 3.2 oz (81.7 kg), last menstrual period 02/21/2024, not currently breastfeeding.  General: Alert, good recall of personal medical history    Respiratory: Non  labored breathing on room air    Cardiovascular: Regular rate    Mental status: Alert, oriented, language fluent, comprehension intact    Cranial nerves: Optic disc margins sharp VF full to confrontation bilaterally. Pupils equal, round, equally reactive to light and accommodation. Extraocular eye movements intact without nystagmus. Face sensation intact to light touch. Face strength symmetric and intact. No dysarthria.    Motor:   Power:   -Right upper extremity: shoulder abductors 5, elbow extensors 5, elbow flexors 5, wrist extensors 5, finger extension 5, finger flexion 5, finger abduction 5  -Left upper extremity: shoulder abductors 5, elbow extensors 5, elbow flexors 5, wrist extensors 5, finger extension 5, finger flexion 5, finger abduction 5  -Right lower extremity: hip flexion 5, knee extension 5, dorsiflexion 5  -Left lower extremity: hip flexion 5, knee extension 5, dorsiflexion 5  Tone: Normal   Bulk: Normal  Abnormal movements: None    Sensory: Intact to light touch and vibration >20s in distal extremities.    Coordination: Finger to nose and heel to shin intact.    Reflexes: Right/Left: Biceps 2/2, triceps 2/2, brachioradialis 2/2, hoffmans negative. Patella 2/2, achilles 2/2.    Gait: Narrow based, symmetric arm swing, no gait assist. Able to tandem.     Labs:   2023 UI  MARLENE neg, ssa ssb neg, dsdna nl. Ace nl, b12 220, lysocyme nl, sed rate nl, crp nl, borrelia nl, smith ab neg, lupus ac neg, tsh and t4 nl, mog ab <1:10   Aqp4 non cell based     Imaging:  Per reports UI 2023  Brain wwo 2023  Findings: The ventricles and cortical sulci are in proportion and consistent with the patient's stated age 28 years. There is no evidence of intraparenchymal hemorrhage, abnormal  diffusion or abnormal contrast enhancement.     There is redemonstration of multiple foci of increased T2 signal within the bilateral cerebral deep and subcortical white matter, not significantly changed from the prior examination.  There are 2 periventricular lesions inferolateral and superior to the right frontal horn (series 800 image 75 and 71), unchanged.   There are no lesions within the corpus callosum or along the callososeptal interface. No cortical or juxtacortical lesion is seen on the double inversion recovery images.   There is a questionable signal abnormality in the dorsal jaqueline, right of the midline (series 6 image 8, series 800 image 112) which could represent artifact. No additional signal abnormality is the seen at the brainstem or cerebellum.    There is no abnormal postcontrast enhancement.     There is no mass effect, midline shift, or extra-axial collection. The craniocervical junction is not compromised.     The orbits are symmetric without proptosis. The paranasal sinuses and mastoid air cells are well pneumatized. The dural venous sinuses are patent. The flow-voids and contrast enhancement of the large intracranial vessels are normal. The bony calvarium is intact.   Redemonstration of multiple scattered white matter lesions, nonspecific.   (The lesions do not meet dissemination in time or in space as they mostly within the deep and subcortical white matter with only 2 periventricular lesions, no juxtacortical/cortical lesion, questionable lesion along the dorsal jaqueline right of the midline, indeterminate and could also represent artifact. No evidence of abnormal enhancement or new lesion compared to the prior study)     Cervical wwo 2023  There is normal alignment of the cervical spine. The vertebral body and disc heights are maintained. There is normal bone marrow and disc signal and enhancement. The craniocervical junction is not compromised. There is no spinal canal or neural foraminal stenosis. The cervical cord has normal caliber and signal. There is no abnormal cord enhancement. The prevertebral soft tissues are unremarkable.     _______________________   IMPRESSION:     MRI cervical spine without and with contrast  is negative for demyelinating plaque or active demyelination on contrast-enhanced images.     Thoracic wwo 2023  The alignment of thoracic segments preserves a customary smoothly rounded thoracic kyphosis.     The thoracic spinal cord shows a central hydromyelia extending from approximate T9 level in the central spinal cord to approximate T12 level and thereafter redemonstrated just above the conus at L1-L2. The thoracic spinal cord including fluid sensitive and contrast-enhanced views is negative for demonstration of plaque formation or active demyelination.     The lower thoracic segments at T12/L1 shows posterior broad-based disc bulge without neural compromise on thoracic cord or exiting nerve roots at that level.     Thoracic segments are negative for bone marrow infiltration, fracture deformity or bone destruction. The discs including vertebral body endplates are normal at all levels in shape and signal with the exception of T12/L1.     ______________________   IMPRESSION:     1. Thoracic spine MR including contrast-enhanced views shows central syringo/hydromyelia in the lower thoracic spinal cord.     2. Findings are negative for demonstration of demyelinating plaque formation or active demyelination in the thoracic spinal cord on contrast-enhanced views.     Assessment:  Katy Polo is a(n) 29 year old female with a medical history of migraine with aura, visual snow, thoracic syringomyelia, history of unexplained fevers and joint pain in past (spont resolved), and white matter lesions of brain who presents for white matter lesion monitoring. Reports intermittent brief episodes of vertigo without associated sxs, incomplete bladder emptying, and possible trace right hand weakness versus coordination trouble intermittently. Migraines well controlled on lamotrigine (tolerating). Per report MR brain 2023 with white matter lesions bilateral cerebral deep and white matter similar to 2022, two periventricular  lesions, and lesion vs artifact in jaqueline. MR C spine per report 2023 unrevealing. MR T spine 2023 with central syringo/hydromyelia lower cord, mild degenerative changes. Exam unrevealing. Will reimage for surveillance of white matter lesions of unclear etiology and asked her to bring CD with old images. Continue lamotrigine for migraine. Further management as below. Will trend right hand exam.     Plan:  -Disease monitoring  -MR brain, cervical spine, thoracic spine with and wihtout contrast   -Go to lab prior for urine pregnancy test (ensure resulted prior to MRI)   -Bring CD with old images for upload prior to mri  -Disease modifying therapy  Vitamin D3 goal > 40, if < 40 add vit d 3 2000 international units daily to multivitamin (over the counter)  -Symptomatic management  -Aim for at least 150 minutes of exercise per week  -For migraine:     -Continue lamotrigine   -Rizatriptan for abortive             -For slow/incomplete bladder emptying: urogynecology referral and pelvic floor therapy referral    -Let me know if dizziness worsening, may consider PT referral or ENT referral   -Continue following with neuro-ophthalmology, let me know if you need a referral    1. White matter abnormality on MRI of brain  - Vitamin D; Future  - MRI BRAIN (W+WO) + PERFUSION SH(CPT=70553); Future  - MRI SPINE CERVICAL (W+WO) (CPT=72156) [[id number]]; Future  - MRI SPINE THORACIC (W+WO) (CPT=72157) [[id number]]; Future  - Pregnancy Test, Urine; Future  - Urogynecology Referral - In Network    2. Vertigo  - Vitamin D; Future  - MRI BRAIN (W+WO) + PERFUSION SH(CPT=70553); Future  - MRI SPINE CERVICAL (W+WO) (CPT=72156) [[id number]]; Future  - MRI SPINE THORACIC (W+WO) (CPT=72157) [[id number]]; Future  - Pregnancy Test, Urine; Future    3. Incomplete bladder emptying  - Vitamin D; Future  - MRI BRAIN (W+WO) + PERFUSION SH(CPT=70553); Future  - MRI SPINE CERVICAL (W+WO) (CPT=72156) [[id number]]; Future  - MRI SPINE THORACIC (W+WO) (CPT=72157)  [[id number]]; Future  - Pregnancy Test, Urine; Future  - Pelvic Floor Therapy - [place] Location  - Urogynecology Referral - In Network    4. Migraine with aura and without status migrainosus, not intractable  - Vitamin D; Future  - MRI BRAIN (W+WO) + PERFUSION SH(CPT=70553); Future  - MRI SPINE CERVICAL (W+WO) (CPT=72156) [[id number]]; Future  - MRI SPINE THORACIC (W+WO) (CPT=72157) [[id number]]; Future  - Pregnancy Test, Urine; Future    5. Visual snow syndrome  - Vitamin D; Future  - MRI BRAIN (W+WO) + PERFUSION SH(CPT=70553); Future  - MRI SPINE CERVICAL (W+WO) (CPT=72156) [[id number]]; Future  - MRI SPINE THORACIC (W+WO) (CPT=72157) [[id number]]; Future  - Pregnancy Test, Urine; Future    6. Hydromyelia (HCC)  - Vitamin D; Future  - MRI BRAIN (W+WO) + PERFUSION SH(CPT=70553); Future  - MRI SPINE CERVICAL (W+WO) (CPT=72156) [[id number]]; Future  - MRI SPINE THORACIC (W+WO) (CPT=72157) [[id number]]; Future  - Pregnancy Test, Urine; Future  - Urogynecology Referral - In Network    Total time 75 minutes including chart review, eliciting history, physical exam, and counseling.     Jacqui Kathleen, DO

## 2024-05-10 NOTE — PATIENT INSTRUCTIONS
-Disease monitoring  -MR brain, cervical spine, thoracic spine with and wihtout contrast   -Go to lab prior for urine pregnancy test (ensure resulted prior to MRI)   -Bring CD with old images for upload prior to mri  -Disease modifying therapy  Vitamin D3 goal > 40, if < 40 add vit d 3 2000 international units daily to multivitamin (over the counter)  -Symptomatic management  -Aim for at least 150 minutes of exercise per week  -For migraine:     -Continue lamotrigine   -Rizatriptan for abortive             -For slow/incomplete bladder emptying: urogynecology referral and pelvic floor therapy referral    -Let me know if dizziness worsening, may consider PT referral or ENT referral   -Continue following with neuro-ophthalmology, let me know if you need a referral      Refill policies:    Allow 2-3 business days for refills; controlled substances may take longer.  Contact your pharmacy at least 5 days prior to running out of medication and have them send an electronic request or submit request through the “request refill” option in your Dropbox account.  Refills are not addressed on weekends; covering physicians do not authorize routine medications on weekends.  No narcotics or controlled substances are refilled after noon on Fridays or by on call physicians.  By law, narcotics must be electronically prescribed.  A 30 day supply with no refills is the maximum allowed.  If your prescription is due for a refill, you may be due for a follow up appointment.  To best provide you care, patients receiving routine medications need to be seen at least once a year.  Patients receiving narcotic/controlled substance medications need to be seen at least once every 3 months.  In the event that your preferred pharmacy does not have the requested medication in stock (e.g. Backordered), it is your responsibility to find another pharmacy that has the requested medication available.  We will gladly send a new prescription to that  pharmacy at your request.    Scheduling Tests:    If your physician has ordered radiology tests such as MRI or CT scans, please contact Central Scheduling at [phone] right away to schedule the test.  Once scheduled, the [facility] Centralized Referral Team will work with your insurance carrier to obtain pre-certification or prior authorization.  Depending on your insurance carrier, approval may take 3-10 days.  It is highly recommended patients assure they have received an authorization before having a test performed.  If test is done without insurance authorization, patient may be responsible for the entire amount billed.      Precertification and Prior Authorizations:  If your physician has recommended that you have a procedure or additional testing performed the [facility] Centralized Referral Team will contact your insurance carrier to obtain pre-certification or prior authorization.    You are strongly encouraged to contact your insurance carrier to verify that your procedure/test has been approved and is a COVERED benefit.  Although the [facility] Centralized Referral Team does its due diligence, the insurance carrier gives the disclaimer that \"Although the procedure is authorized, this does not guarantee payment.\"    Ultimately the patient is responsible for payment.   Thank you for your understanding in this matter.  Paperwork Completion:  If you require FMLA or disability paperwork for your recovery, please make sure to either drop it off or have it faxed to our office at [phone]. Be sure the form has your name and date of birth on it.  The form will be faxed to our Forms Department and they will complete it for you.  There is a 25$ fee for all forms that need to be filled out.  Please be aware there is a 10-14 day turnaround time.  You will need to sign a release of information (BRENDA) form if your paperwork does not come with one.  You may call the Forms Department with any questions at [phone].  Their fax number  is [phone].

## 2024-05-13 ENCOUNTER — TELEPHONE (OUTPATIENT)
Dept: PODIATRY CLINIC | Facility: CLINIC | Age: 29
End: 2024-05-13

## 2024-05-13 NOTE — TELEPHONE ENCOUNTER
Patient needing referral faxed over to clinic. The clinic ([facility]) their fax number is [phone]. Patient is at clinic now. Please advise.    pain/decreased ROM/decreased strength

## 2024-05-15 ENCOUNTER — LAB ENCOUNTER (OUTPATIENT)
Dept: LAB | Age: 29
End: 2024-05-15
Attending: STUDENT IN AN ORGANIZED HEALTH CARE EDUCATION/TRAINING PROGRAM

## 2024-05-15 DIAGNOSIS — G43.109 MIGRAINE WITH AURA AND WITHOUT STATUS MIGRAINOSUS, NOT INTRACTABLE: ICD-10-CM

## 2024-05-15 DIAGNOSIS — R33.9 INCOMPLETE BLADDER EMPTYING: ICD-10-CM

## 2024-05-15 DIAGNOSIS — Q06.4 HYDROMYELIA (HCC): ICD-10-CM

## 2024-05-15 DIAGNOSIS — R42 VERTIGO: ICD-10-CM

## 2024-05-15 DIAGNOSIS — R90.82 WHITE MATTER ABNORMALITY ON MRI OF BRAIN: ICD-10-CM

## 2024-05-15 DIAGNOSIS — H53.19 VISUAL SNOW SYNDROME: ICD-10-CM

## 2024-05-15 LAB — VIT D+METAB SERPL-MCNC: 26.7 NG/ML (ref 30–100)

## 2024-05-15 PROCEDURE — 82306 VITAMIN D 25 HYDROXY: CPT

## 2024-05-15 PROCEDURE — 36415 COLL VENOUS BLD VENIPUNCTURE: CPT

## 2024-05-17 ENCOUNTER — TELEPHONE (OUTPATIENT)
Dept: NEUROLOGY | Facility: CLINIC | Age: 29
End: 2024-05-17

## 2024-05-17 NOTE — TELEPHONE ENCOUNTER
Received through mail imaging report and imaging disc for MR Cervical w/wo cont DOS 05/08/23, MR Brain w/wo Cont DOS 05/01/23, MR Thoracic Spine w/wo cont DOS 04/23/23 and MR Brain w/wo cont DOS 09/12/22 done at [facility]. Uploaded disc into pacs, imaging disc and report were placed in the nurses bin for review.

## 2024-05-21 NOTE — TELEPHONE ENCOUNTER
[facility] medical records placed in Dr Kathleen's folder for review and disc given to PSR to upload in PACS.

## 2024-05-23 ENCOUNTER — TELEPHONE (OUTPATIENT)
Dept: NEUROLOGY | Facility: CLINIC | Age: 29
End: 2024-05-23

## 2024-05-23 NOTE — TELEPHONE ENCOUNTER
Left message for patient, appointment for 08/16 has been canceled due to provider being out. Offered appointment for 08/14 and 8/21. [employer] message has been sent to patient.Please assist patient

## 2024-05-23 NOTE — TELEPHONE ENCOUNTER
[facility] medical records reviewed by dr Kathleen and sent to scanning.

## 2024-05-31 ENCOUNTER — TELEPHONE (OUTPATIENT)
Dept: PODIATRY CLINIC | Facility: CLINIC | Age: 29
End: 2024-05-31

## 2024-05-31 NOTE — TELEPHONE ENCOUNTER
Janet from [facility] faxed an order for patient and would like a follow up. Please advise

## 2024-06-09 RX ORDER — ESCITALOPRAM OXALATE 10 MG/1
10 TABLET ORAL DAILY
Qty: 90 TABLET | Refills: 1 | Status: SHIPPED | OUTPATIENT
Start: 2024-06-09 | End: 2024-07-09

## 2024-06-09 NOTE — TELEPHONE ENCOUNTER
Refill passed per [facility] protocol.     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    [facility]    [facility] Visit    3 months [facility]    [facility]    3 months ago Epidermal cyst of face    [facility]    Office Visit    3 months ago Toe pain, right    [facility]    [facility] Visit          Future Appointments         Provider Department Appt Notes    In 5 days Leatha Orlando DO [facility] Urogynecology Incomplete bladder emptying - referred by Jacqui Kathleen    In 2 weeks  MR RM4 (3T WIDE) [facility] MRI     In 1 month [facility] MRI 2 (3T WIDE) [facility] MRI     In 1 month [facility] MRI 2 (3T WIDE) [facility] MRI     In 2 months Jacqui Kathleen DO [facility] [place] ms folllow up                    Passed - Depression Screening completed within the past 12 months              Recent Outpatient Visits              1 month ago White matter abnormality on MRI of brain    [facility]  Jacqui Mojica DO    Office Visit    2 months ago 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    [facility]    3 months ago Epidermal cyst of face    [facility] Rohan Yoon MD    Office Visit    3 months ago Toe pain, right    [facility]    Office Visit             Future Appointments         Provider Department Appt Notes    In 5 days Leatha Orlando DO [facility] Urogynecology Incomplete bladder emptying - referred by Jacqui Kathleen    In 2 weeks  MR RM4 (3T WIDE) [facility] MRI     In 1 month [facility] MRI RM2 (3T WIDE) [facility] MRI     In 1 month [facility] MRI RM2 (3T WIDE) [facility] MRI     In 2 months Jacqui Kathleen DO [facility] ms ramesh zambrano

## 2024-06-12 RX ORDER — ESCITALOPRAM OXALATE 10 MG/1
10 TABLET ORAL DAILY
Qty: 90 TABLET | Refills: 1 | OUTPATIENT
Start: 2024-06-12

## 2024-06-12 NOTE — TELEPHONE ENCOUNTER
Duplicate request, previously addressed.        Disp Refills Start End     escitalopram 10 MG Oral Tab 90 tablet 1 6/9/2024 --    Sig - Route: Take 1 tablet (10 mg total) by mouth daily. - Oral    Sent to pharmacy as: Escitalopram Oxalate 10 MG Oral Tablet (Lexapro)    E-Prescribing Status: Receipt confirmed by pharmacy (6/9/2024  3:22 PM CDT)    Renewals    Renewal provider: Sunni Macias APRN         Pharmacy    [id number] - [address], [phone], [phone]

## 2024-06-14 ENCOUNTER — OFFICE VISIT (OUTPATIENT)
Dept: UROLOGY | Facility: HOSPITAL | Age: 29
End: 2024-06-14
Attending: OBSTETRICS & GYNECOLOGY
Payer: MEDICAID

## 2024-06-14 VITALS — RESPIRATION RATE: 18 BRPM | BODY MASS INDEX: 28.93 KG/M2 | HEIGHT: 66 IN | WEIGHT: 180 LBS

## 2024-06-14 DIAGNOSIS — R35.1 NOCTURIA: ICD-10-CM

## 2024-06-14 DIAGNOSIS — R39.14 FEELING OF INCOMPLETE BLADDER EMPTYING: Primary | ICD-10-CM

## 2024-06-14 DIAGNOSIS — N81.84 PELVIC MUSCLE WASTING: ICD-10-CM

## 2024-06-14 LAB
BLOOD URINE: NEGATIVE
CONTROL RUN WITHIN 24 HOURS?: YES
LEUKOCYTE ESTERASE URINE: NEGATIVE
NITRITE URINE: NEGATIVE

## 2024-06-14 PROCEDURE — 87086 URINE CULTURE/COLONY COUNT: CPT | Performed by: OBSTETRICS & GYNECOLOGY

## 2024-06-14 PROCEDURE — 81002 URINALYSIS NONAUTO W/O SCOPE: CPT | Performed by: OBSTETRICS & GYNECOLOGY

## 2024-06-14 PROCEDURE — 99212 OFFICE O/P EST SF 10 MIN: CPT

## 2024-06-14 RX ORDER — UBROGEPANT 100 MG/1
100 TABLET ORAL
COMMUNITY

## 2024-06-14 NOTE — TELEPHONE ENCOUNTER
Pt called in stating she seen a specialist for her \"unknown origin fever\" and she states this doctor recommended she goes off of Lexapro. Pt is asking if  [facility] agrees with this plan? Please advise. Him/He

## 2024-06-14 NOTE — PROGRESS NOTES
Leatha Orlando,   2024     Referred by Dr. Kathleen (neuro)  Pt here with self    Chief Complaint   Patient presents with    Other     Reports sense of incomplete emptying x 1 year       HPI:  No GILSON  No UUI  Nocturia x0-1  No prolapse sx  Not sexually active, n/a dyspareunia  Reg bowels    Sense of incomplete bladder emptying    PRIOR TREATMENTS:    Kegels    No  UTIs  No gross hematuria        ?neuro d/o?MS    Vitals:  Resp 18   Ht 66\"   Wt 180 lb (81.6 kg)   LMP 2024   BMI 29.05 kg/m²      HISTORY:  Past Medical History:    Acne    Anxiety state    Depression with anxiety    Heavy periods    Migraine    with aura     Migraines    Other acne    Tachycardia      Past Surgical History:   Procedure Laterality Date    Patient denies any surgical history        Family History   Problem Relation Age of Onset    Hypertension Mother     Obesity Mother     Heart Disorder Maternal Grandfather     Cancer Maternal Grandfather     Skin cancer Maternal Grandfather     Prostate Cancer Father       Social History     Socioeconomic History    Marital status: Single   Occupational History    Occupation:     Tobacco Use    Smoking status: Never    Smokeless tobacco: Never   Vaping Use    Vaping status: Never Used   Substance and Sexual Activity    Alcohol use: No    Drug use: No    Sexual activity: Never     Comment: never been sexual active    Other Topics Concern    Pt has a pacemaker No    Pt has a defibrillator No    Reaction to local anesthetic No    Blood Transfusions No    Caffeine Concern Yes     Comment: once weekly    Exercise Yes   Social History Narrative    PATIENT DENIES ANY H/X abuse.     Pt lives with parents.    Pt is a .         Allergies:  No Known Allergies    Medications:  Outpatient Medications Prior to Visit   Medication Sig Dispense Refill    ubrogepant (UBRELVY) 100 MG Oral Tab Take 100 mg by mouth.      escitalopram 10 MG Oral Tab Take 1 tablet (10 mg total) by mouth  daily. 90 tablet 1    Misc. Devices Does not apply Misc Compound 6.5% dapsone, clindamycin 1 % and tretinoin 0.025%. 45g. Use pea sized amount to entire face once nightly. 1 each 11    spironolactone 100 MG Oral Tab Take 1 tablet (100 mg total) by mouth daily. 90 tablet 3    lamoTRIgine 100 MG Oral Tab Take 1 tablet (100 mg total) by mouth daily.      cyanocobalamin 1000 MCG Oral Tab Take 1 tablet (1,000 mcg total) by mouth once a week. (Patient not taking: Reported on 6/14/2024)      Rizatriptan Benzoate 10 MG Oral Tab Take 1 tablet (10 mg total) by mouth as needed for Migraine. May repeat dose in 2 hours.  Maximum dose 2 tablets in 24 hours. (Patient not taking: Reported on 6/14/2024) 9 tablet 1     No facility-administered medications prior to visit.       Urogynecology Summary:  Urogynecology Summary  Prolapse: No  GILSON: No  Urge Incontinence: No  Nocturia Frequency: 1 (0 to 1 times during the night.)  Frequency: Greater than 3 hours  Incomplete emptying: Yes (Reports sense of incomplete emptying at times.)  Constipation: No  Wears pad day?: 0  Wears Pad Night?: 0  Activities are limited by UI/POP?: No  Currently Sexually Active: No  Avoids sexual activity due to: Other (No partner)    Review of Systems:    A comprehensive 12 point review of systems was completed.  Pertinent positives noted in the the HPI.  No CP  No SOB    GENERAL EXAM:  GENERAL:  Alert and Oriented, and NAD  HEENT:  Normal, no lesions  LUNGS:  Normal effort  HEART:  RRR  ABDOMEN: soft, no mass, no hernia  EXTREM:  Normal, no edema  SKIN:  Normal, no lesions    PELVIC EXAM:  Ext. Gen: no atrophy, no lesions  Urethra: no atrophy, nontender  Bladder:some fullness, nontender  Vagina: no atrophy  Cervix: no bleeding, no lesions, nontender  Uterus: +mobile  Adnexa:no masses, nontender  Perineum: nontender  Anus: wnl  Rectum: defer    PELVIS FLOOR NEUROMUSCULAR FUNCTION:  Strength:  1 and Unable to hold greater than 3 sec  Perineal Sensation:   Normal      PELVIC SUPPORT:  [place]:  0  Ant:  0  Post:  0  CST:  negative  UVJ: not hypermobile    Pt examined with chaperone, RN    Impression/Plan:    ICD-10-CM    1. Feeling of incomplete bladder emptying  R39.14 POCT urinalysis dipstick[53902]     Urine Culture, Routine      2. Nocturia  R35.1       3. Pelvic muscle wasting  N81.84           Discussion Items:   Urodynamics and cystoscopy for evaluation of LUTS  Behavioral and pharmacologic treatments for OAB  Pelvic muscle rehabilitation including pelvic floor PT  Discussed dietary and behavioral modification, discussed pharmacologic and nonpharmacologic mgmt options for urinary symptoms.    Discussed dietary and behavioral modifications for mgmt of urinary symptoms  Discussed weight management and benefits of weight loss on urinary symptoms  Reviewed AUA/SUFU guidelines on mgmt of non-neurogenic OAB  Discussed pharmacologic and nonpharmacologic mgmt options of urinary symptoms - reviewed risks, benefits, alternatives, and goals of treatment  Discussed specific risks related to OAB meds including, but not limited to dry mouth, constipation, blurry vision, cognitive changes, and BP elevation.    Discussed neuro sx and possible neurogenic bladder component    Diagnostic Items:  Urodynamics  Urine testing    Medications Discussed:  OAB meds    Treatment Plan, Non-surgical:   RN teaching/pt education done    Treatment Plan, Surgical:   None    Pt verbalizes understanding of all above discussed information. All questions answered. She agrees to plan    Return for UDS, follow up.    Leatha Orlando, DO, FACOG, FACS      Discussion undertaken in English, info provided      The 21st Century Cures Act makes medical notes like these available to patients in the interest of transparency. However, be advised this is a medical document. It is intended as peer to peer communication. It is written in medical language and may contain abbreviations or verbiage that are  unfamiliar. It may appear blunt or direct. Medical documents are intended to carry relevant information, facts as evident, and the clinical opinion of the practitioner.

## 2024-06-21 ENCOUNTER — TELEPHONE (OUTPATIENT)
Dept: ADMINISTRATIVE | Facility: HOSPITAL | Age: 29
End: 2024-06-21

## 2024-06-21 NOTE — TELEPHONE ENCOUNTER
[facility]  Outside Information  Office Visit  4/22/2024  OCC: Neurology Clinic  Katy [place]son - 29 y.o. Female; born [birth year]Encounter Summary, generated on May 15, 2024May 15, 2024   Reason for Referral    Imaging (Routine) - Pending Review  Reason for Referral - Imaging (Routine) - Pending Review  Specialty Diagnoses / Procedures Referred By Contact Referred To Contact   Radiology Diagnoses   Abnormal MRI of head   Syrinx of spinal cord (CMS/HCC)      Procedures   MR Thoracic Spine With And Without Contrast  Pari Valdez MD   [address] [facility], [address] [place] [address]   Phone: [phone]   Fax: [phone]        Reason for Referral - Imaging (Routine) - Pending Review  Referral ID Status Reason Start Date Expiration Date Visits Requested Visits Authorized   [id number] Pending Review   4/22/2024 4/22/2025 1 1      Electronically signed by Pari Valdez MD at 04/22/2024 2:35 PM CDT        Imaging (Routine) - Pending Review  Reason for Referral - Imaging (Routine) - Pending Review  Specialty Diagnoses / Procedures Referred By Contact Referred To Contact   Radiology Diagnoses   Abnormal MRI of head      Procedures   MR Cervical Spine With And Without Contrast  Pari Valdez MD   [facility] [facility], [address], [address] [place] [address]   Phone: [phone]   Fax: [phone]        Reason for Referral - Imaging (Routine) - Pending Review  Referral ID Status Reason Start Date Expiration Date Visits Requested Visits Authorized   [id number] Pending Review   4/22/2024 4/22/2025 1 1      Electronically signed by Pari Valdez MD at 04/22/2024 2:35 PM CDT        Imaging (Routine) - Pending Review  Reason for Referral - Imaging (Routine) - Pending Review  Specialty Diagnoses / Procedures Referred By Contact Referred To Contact   Radiology Diagnoses   Abnormal MRI of head      Procedures   MR Brain With And Without Contrast  Courtney  MD Pari   [address] [facility], [address], [address] [place] [address]   Phone: [phone]   Fax: [phone]        Reason for Referral - Imaging (Routine) - Pending Review  Referral ID Status Reason Start Date Expiration Date Visits Requested Visits Authorized   [id number] Pending Review   4/22/2024 4/22/2025 1 1      Electronically signed by Pari Valdez MD at 04/22/2024 2:35 PM CDT   Reason for Visit    Reason for Visit -  Reason Comments   Visual Field Change     Gait Problem       Encounter Details    Encounter Details  Date Type Department Care Team (Latest Contact Info) Description   04/22/2024 2:00 PM CDT Office Visit OCC: Neurology Clinic   [address] [facility]   [place] [address] [phone]  Devante Qureshi MD   [address] [facility] [address]   [place] [address]   [phone] (Work)   [phone] (Fax)  Abnormal MRI of head (Primary Dx);  Syrinx of spinal cord (CMS/HCC)     Social History  - documented as of this encounter  Social History  Tobacco Use Types Packs/Day Years Used Date   Smoking Tobacco: Never           Smokeless Tobacco: Never             Social History  Alcohol Use Standard Drinks/Week Comments   Not Currently 0 (1 standard drink = 0.6 oz pure alcohol)       Social History  PHQ-2 Answer Date Recorded   Patient Health Questionnaire-2 Score 0 04/22/2024     Social History  Sex and Gender Information Value Date Recorded   Sex Assigned at Birth Not on file     Gender Identity Not on file     Sexual Orientation Not on file       Last Filed Vital Signs  - documented in this encounter  Last Filed Vital Signs  Vital Sign Reading Time Taken Comments   Blood Pressure 136/92 04/22/2024 2:11 PM CDT     Pulse 84 04/22/2024 2:11 PM CDT     Temperature - -     Respiratory Rate 12 04/22/2024 2:11 PM CDT     Oxygen Saturation 100% 04/22/2024 2:11 PM CDT     Inhaled Oxygen Concentration - -     Weight 81.6 kg (180 lb) 04/22/2024 2:11 PM CDT      Height 160 cm (5' 2.99\") 04/22/2024 2:11 PM CDT     Body Mass Index 31.89 04/22/2024 2:11 PM CDT       Patient Instructions  - documented in this encounter  Attachments  The following attachments cannot be sent through Care Everywhere.   How to Prevent Falls at Home ([facility] Custom) (English)  Progress Notes  - documented in this encounter  Devante Qureshi MD - 04/22/2024 2:00 PM CDT  Formatting of this note is different from the original.  [facility]  Neurology Clinic Note  Established patient, in-person visit    Chief complaint: follow up for suspected MS  Subjective  HPI (3/28/23):  Katy Polo is a 28 y.o. female. She has a past medical history of Anxiety (2012), Headache (migraines starting in 2011), and Visual impairment (nearsighted since 2005, visual snow 2022). Patient has a history of a 3 year (0741-9076) low-grade fever that has since resolved. She sought care at [facility], and was told that it was autoimmune in origin. Due to patients history with Visual Snow, patient underwent an MRI in Aug 2022 that was suspicious for MS. Patient describes symptoms for the past year of dizziness, numbness/tingling, fatigue at the end of the day, electrical shock sensation down the spine, urinary urgency, and dissociation. Additionally, patient reports feelings of difficulty grabbing items at the end of the day and heaviness in the limbs. Patient denied changes in bowel movements and hearing. She is currently on lamotrigine for her symptoms of Visual Snow, patient reports that her symptoms have been stable.    Interim History:  Feels she has been clumsy: tries to  stuff and drops things.  Eye sight has \"spider web\" blurring. Denies any incontinence, numbness, or weakness.  Has noticed occasional tingling in her fingers.  Currently on Lamictal for migraines and visual snow which are well controlled.    Answers submitted by the patient for this  visit:  Neurological Problem Questionnaire (Submitted on 4/17/2024)  Chief Complaint: Neurologic complaint  clumsiness: No  altered mental status: No  syncope: No  loss of balance: Yes  focal sensory loss: No  memory loss: No  near-syncope: No  slurred speech: No  visual change: Yes  weakness: Yes  focal weakness: No  Chronicity: chronic  Onset: more than 1 year ago  Onset quality: gradually  Progression since onset: waxing and waning  Focality: no focality noted  abdominal pain: No  aura: No  back pain: No  bladder incontinence: No  bowel incontinence: No  chest pain: No  confusion: No  dizziness: Yes  fatigue: Yes  vertigo: Yes  fever: No  headaches: Yes  auditory change: No  light-headedness: No  nausea: Yes  neck pain: No  palpitations: No  shortness of breath: No  diaphoresis: No  vomiting: No  Treatments tried: acetaminophen, drinking, eating, medication, position change, sleep, walking  Improvement on treatment: mild    Neurological Review of Symptoms:  Bladder symptoms Negative  Bowel symptoms Negative  Pain Negative  Numbness or tingling Negative  Confusion Negative  Memory Loss Negative  Speech Disorder Negative  Convulsions Negative  Black-outs or syncope Negative  Weakness Negative  Muscle twitching or cramping Negative  Headaches Negative  Vertigo or dizziness Negative  Tinnitus Negative  Hearing Loss Negative  Imbalance or falling Negative  Blurred vision Negative  Visual loss Negative  Double vision Negative  Difficulty with gait or walking Negative    General Review of Symptoms:  ENT Negative  Ophthalmological Negative  Cardiovascular Negative  Gastrointestinal Negative  Hematological/lymphatic Negative  Respiratory Negative  Musculoskeletal Negative  Genito-urinary Negative  Endocrine Negative  Constitutional Negative    Surgical History: Reviewed. No Changes since last visit noted above.  Family History: Reviewed. No Changes since last visit noted above.  Social History: Reviewed. No Changes since  last visit noted above.  Past Medical History: Reviewed. No Changes since last visit noted above.    Current Outpatient Medications  Medication Sig Dispense Refill  cyanocobalamin (VITAMIN B-12) 1000 MCG tablet Take 1 tablet (1,000 mcg total) by mouth 1 (one) time each day. (Patient not taking: Reported on 11/29/2023) 30 tablet 11  ESCitalopram (LEXAPRO) 10 MG tablet Take 1 tablet (10 mg total) by mouth 1 (one) time each day.  lamoTRIgine (LaMICtal) 100 MG tablet TAKE 1 TABLET(100 MG) BY MOUTH 1 TIME EACH DAY 30 tablet 11  riboflavin (VITAMIN B-2) 25 MG tablet Take 16 tablets (400 mg total) by mouth every other day. (Patient not taking: Reported on 11/29/2023)  rizatriptan (MAXALT) 10 MG tablet Take 1 tablet (10 mg total) by mouth 1 (one) time if needed for migraine. May repeat in 2 hours if unresolved. Do not exceed 30 mg in 24 hours.  spironolactone (ALDACTONE) 100 MG tablet TAKE 1 TABLET(100 MG) BY MOUTH DAILY    No current facility-administered medications for this visit.    Objective  Visit Vitals  BP (!) 136/92 (Patient Position: Sitting)  Pulse 84  Resp 12  Ht 1.6 m  Wt 81.6 kg  SpO2 100%  BMI 31.89 kg/m²  OB Status Having periods  Smoking Status Never  BSA 1.9 m²    Neurological Exam  Mental Status  Awake, alert and oriented to person, place and time. Speech is normal. Language is fluent with no aphasia.    Cranial Nerves  CN II: Visual fields full to confrontation. Right funduscopic exam: disc intact. Left funduscopic exam: disc intact.  CN III, IV, VI: Extraocular movements intact bilaterally. Normal lids and orbits bilaterally. Pupils equal round and reactive to light bilaterally.  CN V: Facial sensation is normal.  CN VII: Full and symmetric facial movement.  CN VIII: Hearing is normal.  CN IX, X: Palate elevates symmetrically  CN XI: Shoulder shrug strength is normal.  CN XII: Tongue midline without atrophy or fasciculations.    Motor  Normal muscle bulk throughout. Normal muscle tone. No pronator  drift.  Right Left  Shoulder abduction 5 5  Elbow flexion 5 5  Elbow extension 5 5  Hip flexion 5 5  Knee flexion 5 5  Knee extension 5 5  Plantarflexion 5 5  Dorsiflexion 5 5    Sensory  Light touch is normal in upper and lower extremities.    Reflexes  Right Left  Brachioradialis 2+ 2+  Biceps 2+ 2+  Triceps 2+ 2+  Patellar 2+ 2+  Achilles 2+ 2+    Coordination  Right: Finger-to-nose normal. Heel-to-shin normal.Left: Finger-to-nose normal. Heel-to-shin normal.    Gait  Normal casual, toe, heel and tandem gait.    Diagnostics:  === Results for orders placed during the hospital encounter of 05/01/23 ===    MR BRAIN WITH AND WITHOUT CONTRAST    - Impression -  Redemonstration of multiple scattered white matter lesions, nonspecific.  (The lesions do not meet dissemination in time or in space as they mostly within the deep and subcortical white matter with only 2 periventricular lesions, no juxtacortical/cortical lesion, questionable lesion along the dorsal jaqueline right of the midline, indeterminate and could also represent artifact. No evidence of abnormal enhancement or new lesion compared to the prior study)    This study was reviewed and interpreted by radiology attending Dr. Coley with dictation by radiology resident Dr. Hugo Contreras.    Dictated By: Hugo Contreras, 5/2/2023 9:32 AM    I have reviewed these images and agree with the resident's interpretation.    Electronic Signed By: Attending Radiologist: Robinson Blackburn  Signed on 5/2/2023 1:46 PM    === Results for orders placed during the hospital encounter of 05/01/23 ===    MR CERVICAL SPINE WITH AND WITHOUT CONTRAST    - Impression -  MRI cervical spine without and with contrast is negative for demyelinating plaque or active demyelination on contrast-enhanced images.    Dictated by resident Dr. Olivares.    Dictated By: Trino Olivares, 5/8/2023 2:25 PM    I have reviewed these images and agree with the resident's interpretation.    Electronic Signed By:  Attending Radiologist: Trino Aceves  Signed on 5/8/2023 4:15 PM    Assessment/Plan  Katy Polo is a 29 y.o. female with migraines and visual snow phenomena, presenting for follow up after MRI brain (obtained due to visual snow symptoms) showed some non-specific lesions, for which she was sent in for evaluation of MS. MRI brain reviewed and lesions not typical for MS and more consistent with patient's known migraine disorder. MRI thoracic spine had evidence of small syrinx, but w/o objective findings on exam or evidence of chiari malformation, and w/o demyelinating lesions. Today, patient remains with a stable normal neurological exam and no clinical history of MS attacks. At this time, patient's history and clinical presentation do not demonstrate enough evidence for diagnosis of MS. Will repeat neuroimaging to compare to prior studies and assess for any progression or new evidence of disease.    1. Abnormal MRI of head  - MR Brain With And Without Contrast; Future  - MR Cervical Spine With And Without Contrast; Future  - MR Thoracic Spine With And Without Contrast; Future  2. Syrinx of spinal cord (CMS/HCC)  - MR Thoracic Spine With And Without Contrast; Future    Follow up in about 3 months after MRIs have been completed to discuss results. Patient should be scheduled in clinic whenever Neuro-immunologist is staffing. Patient to call sooner if any questions or concerns.    Patient seen by and plan of care discussed with attending physician, Dr. Valdez, who concurs.    Devante Hooks MD  Neurology PGY-4  [facility] NEUROLOGY    Electronically signed by Pari Valdez MD at 04/30/2024 9:34 AM CDT    Associated attestation - Pari Valdez MD - 04/30/2024 9:34 AM CDT  Formatting of this note is different from the original.  I saw and evaluated the patient, participating in the key portions of the service. I reviewed the resident’s note. I agree with the resident’s findings and plan.    I personally  spent 30 minutes for face to face communication with the patient in counseling and discussion as well as coordination of care as described above.    Pari Valdez MD    Neurology Department   Plan of Treatment  - documented as of this encounter  Plan of Treatment - Upcoming Encounters  Upcoming Encounters  Date Type Department Care Team (Latest Contact Info) Description   06/05/2024 10:00 AM CDT Office Visit [facility]: [facility]   [address] [facility] [address] [place] [address] [phone]  Aurora Wilder,    [address] [place] [address]   [phone] (Work)   [phone] (Fax)        Plan of Treatment - Scheduled Orders  Scheduled Orders  Name Type Priority Associated Diagnoses Order Schedule   MR Brain With And Without Contrast Imaging Routine (imaging as available) Abnormal MRI of head  Expected: 04/22/2024, Expires: 05/22/2025   MR Cervical Spine With And Without Contrast Imaging Routine (imaging as available) Abnormal MRI of head  Expected: 04/22/2024, Expires: 05/22/2025   MR Thoracic Spine With And Without Contrast Imaging Routine (imaging as available) Abnormal MRI of head   Syrinx of spinal cord (CMS/HCC)  Expected: 04/22/2024, Expires: 05/22/2025     Visit Diagnoses  - documented in this encounter  Visit Diagnoses  Diagnosis   Abnormal MRI of head - Primary   Nonspecific (abnormal) findings on radiological and other examination of skull and head    Syrinx of spinal cord (CMS/HCC)      Patient Demographics    Patient Demographics  Patient Address Patient Name Communication   [address] [place] [id number] Katy Polo [phone] (Home)  [phone] (Mobile)  [email or web address]     Patient Demographics  Language Race / Ethnicity Marital Status   English - Written (Preferred) White / Not  or  Single     Document Information    Service Providers Document Coverage Dates    Apr. 22, 2024April 22, 2024       Organization   [facility]  [address] [place] [address]     Encounter Providers Encounter Date   Devante Hooks MD (Attending)  NPI: [id number]  [phone] (Work)  [phone] (Fax)  [address] [address]  Neurology Apr. 22, 2024April 22, 2024     Legal Authenticator    Him, Neptali Manager

## 2024-06-24 ENCOUNTER — LAB ENCOUNTER (OUTPATIENT)
Dept: LAB | Age: 29
End: 2024-06-24
Attending: FAMILY MEDICINE

## 2024-06-24 DIAGNOSIS — G43.109 MIGRAINE WITH AURA AND WITHOUT STATUS MIGRAINOSUS, NOT INTRACTABLE: ICD-10-CM

## 2024-06-24 DIAGNOSIS — R33.9 INCOMPLETE BLADDER EMPTYING: ICD-10-CM

## 2024-06-24 DIAGNOSIS — R90.82 WHITE MATTER ABNORMALITY ON MRI OF BRAIN: ICD-10-CM

## 2024-06-24 DIAGNOSIS — Q06.4 HYDROMYELIA (HCC): ICD-10-CM

## 2024-06-24 DIAGNOSIS — R42 VERTIGO: ICD-10-CM

## 2024-06-24 DIAGNOSIS — H53.19 VISUAL SNOW SYNDROME: ICD-10-CM

## 2024-06-24 LAB — B-HCG UR QL: NEGATIVE

## 2024-06-24 PROCEDURE — 81025 URINE PREGNANCY TEST: CPT

## 2024-06-27 ENCOUNTER — HOSPITAL ENCOUNTER (OUTPATIENT)
Dept: MRI IMAGING | Facility: HOSPITAL | Age: 29
Discharge: HOME | End: 2024-06-27
Attending: STUDENT IN AN ORGANIZED HEALTH CARE EDUCATION/TRAINING PROGRAM

## 2024-06-27 DIAGNOSIS — H53.19 VISUAL SNOW SYNDROME: ICD-10-CM

## 2024-06-27 DIAGNOSIS — Q06.4 HYDROMYELIA (HCC): ICD-10-CM

## 2024-06-27 DIAGNOSIS — R42 VERTIGO: ICD-10-CM

## 2024-06-27 DIAGNOSIS — G43.109 MIGRAINE WITH AURA AND WITHOUT STATUS MIGRAINOSUS, NOT INTRACTABLE: ICD-10-CM

## 2024-06-27 DIAGNOSIS — R33.9 INCOMPLETE BLADDER EMPTYING: ICD-10-CM

## 2024-06-27 DIAGNOSIS — R90.82 WHITE MATTER ABNORMALITY ON MRI OF BRAIN: ICD-10-CM

## 2024-06-27 PROCEDURE — A9575 INJ GADOTERATE MEGLUMI 0.1ML: HCPCS | Performed by: STUDENT IN AN ORGANIZED HEALTH CARE EDUCATION/TRAINING PROGRAM

## 2024-06-27 PROCEDURE — 70553 MRI BRAIN STEM W/O & W/DYE: CPT | Performed by: STUDENT IN AN ORGANIZED HEALTH CARE EDUCATION/TRAINING PROGRAM

## 2024-06-27 RX ORDER — GADOTERATE MEGLUMINE 376.9 MG/ML
24 INJECTION INTRAVENOUS
Status: COMPLETED | OUTPATIENT
Start: 2024-06-27 | End: 2024-06-27

## 2024-06-27 RX ADMIN — GADOTERATE MEGLUMINE 24 ML: 376.9 INJECTION INTRAVENOUS at 14:34:00

## 2024-07-09 ENCOUNTER — TELEPHONE (OUTPATIENT)
Dept: FAMILY MEDICINE CLINIC | Facility: CLINIC | Age: 29
End: 2024-07-09

## 2024-07-09 RX ORDER — ESCITALOPRAM OXALATE 20 MG/1
20 TABLET ORAL DAILY
Qty: 90 TABLET | Refills: 0 | Status: SHIPPED | OUTPATIENT
Start: 2024-07-09

## 2024-07-09 NOTE — TELEPHONE ENCOUNTER
Patient called and requesting if she could increase  her escitalopram dose, she has increase  anxiety .  Currently she is on  Escitalopram  10 mg daily .        Future Appointments   Date Time Provider Department Center   7/23/2024  4:45 PM [facility] MRI RM2 (3T WIDE) [facility] [place]   7/23/2024  5:45 PM [facility] MRI RM2 (3T WIDE) [facility] [place]   8/8/2024 10:00 AM [facility] PROCEDURE [facility]   8/9/2024  3:45 PM Jacqui Kathleen DO ENINAPER [facility] [place]   8/16/2024 12:30 PM Leatha Orlando, DO [facility]   '

## 2024-07-09 NOTE — TELEPHONE ENCOUNTER
Yes, please let her know new Rx sent to pharmacy.  Additional side effects unlikely, but call if significant nausea, headache, fatigue.  Make appointment with me to follow-up on this within the next month.

## 2024-07-23 ENCOUNTER — HOSPITAL ENCOUNTER (OUTPATIENT)
Dept: MRI IMAGING | Facility: HOSPITAL | Age: 29
Discharge: HOME | End: 2024-07-23
Attending: STUDENT IN AN ORGANIZED HEALTH CARE EDUCATION/TRAINING PROGRAM
Payer: MEDICAID

## 2024-07-23 DIAGNOSIS — H53.19 VISUAL SNOW SYNDROME: ICD-10-CM

## 2024-07-23 DIAGNOSIS — R90.82 WHITE MATTER ABNORMALITY ON MRI OF BRAIN: ICD-10-CM

## 2024-07-23 DIAGNOSIS — R33.9 INCOMPLETE BLADDER EMPTYING: ICD-10-CM

## 2024-07-23 DIAGNOSIS — R42 VERTIGO: ICD-10-CM

## 2024-07-23 DIAGNOSIS — G43.109 MIGRAINE WITH AURA AND WITHOUT STATUS MIGRAINOSUS, NOT INTRACTABLE: ICD-10-CM

## 2024-07-23 DIAGNOSIS — Q06.4 HYDROMYELIA (HCC): ICD-10-CM

## 2024-07-23 PROCEDURE — 72156 MRI NECK SPINE W/O & W/DYE: CPT | Performed by: STUDENT IN AN ORGANIZED HEALTH CARE EDUCATION/TRAINING PROGRAM

## 2024-07-23 PROCEDURE — A9575 INJ GADOTERATE MEGLUMI 0.1ML: HCPCS | Performed by: STUDENT IN AN ORGANIZED HEALTH CARE EDUCATION/TRAINING PROGRAM

## 2024-07-23 PROCEDURE — 72157 MRI CHEST SPINE W/O & W/DYE: CPT | Performed by: STUDENT IN AN ORGANIZED HEALTH CARE EDUCATION/TRAINING PROGRAM

## 2024-07-23 RX ORDER — GADOTERATE MEGLUMINE 376.9 MG/ML
20 INJECTION INTRAVENOUS
Status: COMPLETED | OUTPATIENT
Start: 2024-07-23 | End: 2024-07-23

## 2024-07-23 RX ADMIN — GADOTERATE MEGLUMINE 20 ML: 376.9 INJECTION INTRAVENOUS at 18:23:00

## 2024-07-24 ENCOUNTER — OFFICE VISIT (OUTPATIENT)
Dept: FAMILY MEDICINE CLINIC | Facility: CLINIC | Age: 29
End: 2024-07-24

## 2024-07-24 ENCOUNTER — NURSE TRIAGE (OUTPATIENT)
Dept: FAMILY MEDICINE CLINIC | Facility: CLINIC | Age: 29
End: 2024-07-24

## 2024-07-24 VITALS
SYSTOLIC BLOOD PRESSURE: 110 MMHG | DIASTOLIC BLOOD PRESSURE: 72 MMHG | HEIGHT: 66 IN | OXYGEN SATURATION: 99 % | HEART RATE: 82 BPM | BODY MASS INDEX: 27.32 KG/M2 | WEIGHT: 170 LBS
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
## 2024-07-24 RX ORDER — AZITHROMYCIN 250 MG/1
TABLET, FILM COATED ORAL
Qty: 6 TABLET | Refills: 0 | Status: SHIPPED | OUTPATIENT
Start: 2024-07-24 | End: 2024-07-28

## 2024-07-24 NOTE — PROGRESS NOTES
Katy Polo is a 29 year old female.  Chief Complaint   Patient presents with    Bump     Bump with redness to left arm by axilla started 3 days ago / getting bigger.     HPI:   Katy Polo presented to the clinic for bump to the left upper arm x 3 days. Worsening erythema. Associated axilla tenderness. Has cat at home. Denies fever, fatigue, chest pain, shortness of breath.     Current Outpatient Medications   Medication Sig Dispense Refill    escitalopram 20 MG Oral Tab Take 1 tablet (20 mg total) by mouth daily. 90 tablet 0    ubrogepant (UBRELVY) 100 MG Oral Tab Take 100 mg by mouth.      spironolactone 100 MG Oral Tab Take 1 tablet (100 mg total) by mouth daily. 90 tablet 3    lamoTRIgine 100 MG Oral Tab Take 1 tablet (100 mg total) by mouth daily.        Past Medical History:    Acne    Anxiety state    Depression with anxiety    Heavy periods    Migraine    with aura     Migraines    Other acne    Tachycardia      Past Surgical History:   Procedure Laterality Date    Patient denies any surgical history        Social History:  Social History     Socioeconomic History    Marital status: Single   Occupational History    Occupation:     Tobacco Use    Smoking status: Never    Smokeless tobacco: Never   Vaping Use    Vaping status: Never Used   Substance and Sexual Activity    Alcohol use: No    Drug use: No    Sexual activity: Never     Comment: never been sexual active    Other Topics Concern    Pt has a pacemaker No    Pt has a defibrillator No    Reaction to local anesthetic No    Blood Transfusions No    Caffeine Concern Yes     Comment: once weekly    Exercise Yes   Social History Narrative    PATIENT DENIES ANY H/X abuse.     Pt lives with parents.    Pt is a .       Family History   Problem Relation Age of Onset    Hypertension Mother     Obesity Mother     Heart Disorder Maternal Grandfather     Cancer Maternal Grandfather     Skin cancer Maternal Grandfather     Prostate Cancer  Father       No Known Allergies     REVIEW OF SYSTEMS:   Review of Systems   Constitutional:  Negative for activity change, chills, fatigue and fever.   Respiratory:  Negative for chest tightness and shortness of breath.    Cardiovascular:  Negative for chest pain and palpitations.   Skin:  Positive for color change.   Neurological: Negative.  Negative for dizziness and headaches.   Psychiatric/Behavioral: Negative.     All other systems reviewed and are negative.     Wt Readings from Last 5 Encounters:   07/24/24 170 lb (77.1 kg)   06/14/24 180 lb (81.6 kg)   05/10/24 180 lb 3.2 oz (81.7 kg)   03/07/24 178 lb 12.8 oz (81.1 kg)   03/05/24 179 lb (81.2 kg)     Body mass index is 27.44 kg/m².      EXAM:   /72 (BP Location: Right arm, Patient Position: Sitting, Cuff Size: adult)   Pulse 82   Ht 5' 6\" (1.676 m)   Wt 170 lb (77.1 kg)   LMP 07/21/2024   SpO2 99%   BMI 27.44 kg/m²   Physical Exam  Vitals reviewed.   Constitutional:       Appearance: Normal appearance.   HENT:      Head: Normocephalic and atraumatic.   Cardiovascular:      Rate and Rhythm: Normal rate and regular rhythm.      Pulses: Normal pulses.      Heart sounds: Normal heart sounds.   Pulmonary:      Effort: Pulmonary effort is normal.      Breath sounds: Normal breath sounds.   Skin:            Comments: Round, flat, erythematic lesion to the left upper arm.   Tenderness to the left axilla with palpation     Neurological:      General: No focal deficit present.      Mental Status: She is alert and oriented to person, place, and time.   Psychiatric:         Mood and Affect: Mood normal.         Behavior: Behavior normal.            ASSESSMENT AND PLAN:   (W55.03XA) Cat scratch  (primary encounter diagnosis)  Plan: Patient with round, flat, erythematic rash to the left upper arm.  Tenderness with palpation to the axilla.  Patient we will start with a cat scratch.  States erythema Is spreading.  Denies fever, fatigue.  HPI examination  consistent with cat scratch disease.  Prescription for Z-Michael to pharmacy.  Advised to complete full course of antibiotics.  Monitor for spreading erythema.  Follow-up as needed.      The patient indicates understanding of these issues and agrees to the plan.  Chaperone offered to the patient prior to examination    This note was prepared using Dragon Medical voice recognition dictation software. As a result errors may occur. When identified these errors have been corrected. While every attempt is made to correct errors during dictation discrepancies may still exist.

## 2024-07-24 NOTE — TELEPHONE ENCOUNTER
Patient called seeking appointment.   She noticed redness on skin of shoulder. Not sure if Insect bite or cat scratch on shoulder.   The area was small and now size of Half dollar, circular; no pus.   Stings, but no itching.     No fever.    Patient needs evaluation.  Rule out infection.     Appointment made with ANTHONY Whitfield 11:30am  Reason for Disposition   Swelling is painful to touch and no fever    Protocols used: Skin Lump or Localized Swelling-A-OH

## 2024-07-29 ENCOUNTER — TELEPHONE (OUTPATIENT)
Dept: FAMILY MEDICINE CLINIC | Facility: CLINIC | Age: 29
End: 2024-07-29

## 2024-07-29 NOTE — TELEPHONE ENCOUNTER
Patient was seen on 7/24/24 for a cat scratch. She completed her antibiotic. The elisa on her arm  is slightly bigger but not as red. It is still flat and warmer to touch than the rest of her arm. She denies any fever. She is leaving to go out of town at noon if you would like to send a new antibiotic.        Katy Woods Rn Triage (supporting JACOB Carrillo)   HR      7/26/24 11:35 AM  Hey there! I'm only on day 3 of antibiotics, so not sure if it would have affected me yet, but the red elisa around the bite/scratch has still been getting bigger every day. I'm happy to wait a little longer and see if these antibiotics work but I'm going out of town on Monday so want to make sure if we try new ones that I'll be able to get them before I leave. Let me know.

## 2024-07-29 NOTE — TELEPHONE ENCOUNTER
Agree with Dr. Garza. It does take time for cat scratch to resolve. I recommend finishing the full course of antibiotics. Can try OTC hydrocortisone cream as well to assist with local symptoms relief.   Please ensure patient did not develop fever- ANTHONY Carrillo

## 2024-07-29 NOTE — TELEPHONE ENCOUNTER
Advised patient of Dr Garza's note. Patient verbalized understanding. Message sent to Sunni for further advice.

## 2024-07-29 NOTE — TELEPHONE ENCOUNTER
It can take a little while for cat scratch to resolve.  I do not recommend more antibiotic for cat scratch.  Ideally needs exam.  But I would be happy to see her at 1 PM as add-on, unfortunately cannot do this before noon.  Sunni-you saw her last week send continue also if you have any input.

## 2024-07-29 NOTE — TELEPHONE ENCOUNTER
See Leslie's note below===patient already completed the antibiotic.     Spoke with patient (verified name and  ) , advised Sunni Macias 's note and verbalized understanding .

## 2024-08-08 ENCOUNTER — OFFICE VISIT (OUTPATIENT)
Dept: UROLOGY | Facility: HOSPITAL | Age: 29
End: 2024-08-08
Payer: MEDICAID

## 2024-08-08 VITALS — HEIGHT: 66 IN | RESPIRATION RATE: 16 BRPM | WEIGHT: 170 LBS | BODY MASS INDEX: 27.32 KG/M2

## 2024-08-08 DIAGNOSIS — R39.14 FEELING OF INCOMPLETE BLADDER EMPTYING: Primary | ICD-10-CM

## 2024-08-08 PROCEDURE — 51741 ELECTRO-UROFLOWMETRY FIRST: CPT

## 2024-08-08 PROCEDURE — 81002 URINALYSIS NONAUTO W/O SCOPE: CPT

## 2024-08-08 PROCEDURE — 51797 INTRAABDOMINAL PRESSURE TEST: CPT

## 2024-08-08 PROCEDURE — 51729 CYSTOMETROGRAM W/VP&UP: CPT

## 2024-08-08 PROCEDURE — 51784 ANAL/URINARY MUSCLE STUDY: CPT

## 2024-08-08 NOTE — PATIENT INSTRUCTIONS
[facility] - [facility] UROGYNECOLOGY  [address]  [facility] [address]  PH: [phone]  FAX: [phone]       Urodynamic Testing Discharge Instructions:    There are NO dietary or activity restrictions.  You may resume your normal schedule.      You may have mild discomfort for a few hours after your testing today.  There may be some mild burning when you urinate or you may see some blood in your urine.  These problems should not last more than 24 hours.  The following suggestions may minimize any symptoms you experience.    Drink 6-8 large glasses of water over the next 8 hours  A compress or sitz bath may be soothing  Tylenol or Ibuprofen may be taken as needed    If you experience any of the following, please call the office or, if after hours, the on-call physician at [phone].    Excessive pain  Bright red bloody discharge  Fever or chills  Continued urgency, frequency or burning with urination    Obtaining Test Results    Your urodynamic test will be interpreted by a specialist and available to the referring physician within 7-14 days.  Patients in our clinic are given an appointment to come back to discuss the results and any appropriate treatment recommendations.    Please do not hesitate to contact our office with any questions or concerns at [phone].    I acknowledge that I have received verbal and written discharge  instructions and that I understand these instructions clearly.    Patient Signature:    Date:    [facility]    HAVING A URINARY CATHETER AFTER SURGERY    What is a urinary catheter?  A catheter is a soft tube used to drain urine from your bladder.    Why do I need a catheter?  A urinary catheter is used to help with healing immediately after surgery.  It works to keep your bladder empty while you are recovering.  Surgery, swelling and anesthesia can cause the bladder to lose its normal sensations  for a while.  The catheter may stay in place for a week or more after you go home.    Where will I go to get the catheter removed?  Your catheter will be removed in the office.  Please call the office to schedule a voiding trial with the nurse.    How will the catheter be removed?  A nurse will disconnect your catheter from the drainage bag.  She will attach a syringe to the catheter and fill the bladder with sterile water until you have a strong desire to urinate.  The catheter will be removed and you will be able to go to the bathroom to empty your bladder.    Will the catheter need to be replaced?  You will need to urinate a specific amount, depending on how much you were initially able to hold.  Your catheter will only need to be replaced if you are not able to empty the specific amount.  If the catheter is replaced, your nurse will discuss with you when you need to return.    What do I do after the catheter is removed?  For the first 24 hours, you should go to the bathroom with your first urge or every 2-3 hours while you are awake.  Urinate before you go to bed and if you wake up in the night, you do not need to set an alarm to wake up.  Drink plenty of water (6-8 glasses) slowly throughout the day.  Avoid liquids containing caffeine.  Continue with any pain medications (Motrin) as directed by the nurse.  Continue with your current bowel regime; avoid constipation.    What if I have trouble emptying my bladder?  If you are having trouble emptying your bladder, try the following tips:  Urinate normally then stand up, walk around for a minute or two, sit back down on the commode and attempt to urinate (double void).  Sit in a tub of warm water and try to urinate in the tub.  Run warm water over your vaginal area while attempting to urinate.    When should I call the office?  If you are unable to urinate for 6 hours.  You feel you need to urinate but cannot.  Urinating frequently in small amounts.  You experience  abdominal bloating, pressure or back pain.  You have a fever over 100.5 for 4 hours or above 101.0 anytime.  You have nausea and/or vomiting.  Burning/pain with urination.    Please feel free to call the nurse at [phone] with any questions 8am-4:30pm Monday-Friday.    If the office is closed, you may call the on call physician at [phone] or go to the emergency room.[facility]     Post-Operative Guidelines    AVOID CONSTIPATION - Take Miralax: one capful in water or juice each morning.  You can also take each evening if needed.  No lifting over 10 lbs. (1 gallon of milk is 8 lbs.)  It is okay to walk up and down stairs and to walk outside, but be careful not to become fatigued.  Showers and tub baths are okay, even if you have a catheter or abdominal incision.  You may ride in a car immediately.  You may drive after 1-2 weeks.    Please call us for any of the following:  Temperature above 100.5 for 4 hours or above 101.0 at any time  Chest pain or trouble breathing  Vaginal bleeding heavier than a period  Redness, tenderness or swelling of your legs  Pain or burning when you urinate  Redness, pain or foul discharge from incision          [facility]    BOWEL REGIMEN    Constipation can have detrimental effects on bladder function and can worsen the symptoms of prolapse.  It is important to avoid constipation.    The first step for treating constipation is to increase the amount of fiber in your diet.  It is usually difficult to get enough fiber each day in the food we eat.  Therefore, the best way to increase fiber is to take a daily fiber supplement.      _____Fiber Supplement  (Metamucil, Citrucel, Benefiber, etc)    Begin with 1 dose (as instructed on the package) every morning.    If the stool is too hard, or if the stool consists of small, hard, marbles, you may need to increase to 1 dose each morning and 1 dose each  evening.    If you find it difficult to take the fiber as directed (i.e. mixed with water or juice), you can mix the fiber with your cereal or with applesauce.    Don’t worry if you have to increase the amount---fiber is not addictive and will not cause diarrhea.      _____Milk of Magnesia    Begin with 1 teaspoon every evening.  This is a very low dose---approximately one-sixth of the typical dose.  You may need to increase the amount depending on your results.      _____Miralax    Miralax is a laxative available over the counter.  It can be used on a long-term basis if necessary.  The usual starting dose is 1 capful of powder mixed in fluid each morning.  The dose can be adjusted up or down depending on results and consistency of stool.

## 2024-08-08 NOTE — PROGRESS NOTES
Patient here for urodynamic testing.  Procedure explained and confirmed by patient.  See evaluation form for results.  Both verbal and written discharge instructions were given.  Patient tolerated procedure well and will follow up with Dr. Leatha Orlando per telephone visit on 24 @12:30 pm.    URODYNAMIC EVALUATION    PATIENT HISTORY:    Prolapse:  No  GILSON:  No  UUI:  No  Nocturia:  0-1  Frequency:  >3 hours  Sense of Incomplete Emptying:  Yes  Constipation:  No  Last void prior to UDS testinhour 30 minutes ago  Current urge to void?  Moderate  OAB meds stopped prior to test?  NA  Other symptoms?      Surgery?  [x]  No  []  Interested in surgery:   []  Yes, specify date:    Surgical folder provided?  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      EXAM:  Urinalysis Dip:Negative blood,nitrates and leukocytes        Urovesico Junction ( <30 degrees ):  []  Mobile  [x]  Fixed    Perineal Sensation:  [x]  Normal  []  Abnormal    Additional Notes:    PROLAPSE:  []  Yes  [x]  No  Prolapse reduced for testing?  []  Yes  [x]  No  []  Pessary  []  Manual  []  Half Speculum    Additional Notes:    UROFLOWMETRY:  Unreduced  Voided Volume:                    80         mL  Maximum Flow Rate:                         11       mL/sec  Average flow rate:                      5       mL/sec  Post-void Residual:                3           mL  Pattern:  []  Normal  [x]  Poor flow     [x]  Intermittent  []  Other  Void:   [x]  Typical  []  Atypical    Additional Notes:    CYSTOMETRY:  Urethral Catheter:  Fr 7 / tdoc  Abd Catheter:     Fr 7 / tdoc   Infusion:  Water Rate 30 mL/min  Temp:  Room  Position:  [x]  Sit  []  Stand  []  Supine  First sensation:   24 mL  First desire to void:   109 mL  Strong desire to void:  250 mL  Maximum cystometric capacity:   256 mL  Detrusor Activity:  [x]   Unstable   []  Stable  Urge leakage?    []  Yes [x]  No  Volume at 1st unhibited detrusor cont:   59 mL  Detrusor instability provoked by:    [x]  Spontaneous []  Coughing  [x]  Filling  []  Valsalva  []  Other    Additional Notes:      URETHRAL FUNCTION:  Valsava (vesical) Leak Point Pressures:    Volume Leak Point Pressure Leak?    Cough Valsalva      100mL 156 170    cm H2O []  Yes [x]  No   200mL 188 173    cm H2O []  Yes [x]  No               Genuine Stress Incontinence demonstrated?   []  Yes  [x]  No    Resting Urethral Pressure Profile:     Functional Urethral Length:         0.3  cm          0.2   cm     Maximum UCP:       110    cm                       107   cm       PRESSURE/FLOW STUDY:  Unreduced  Voided volume:   295     mL  Maximum flow rate:       10 mL/sec  Pressure Detrusor (at maximum flow):          42  cm H2O  Post void residual:         1      mL  Voiding mechanism:  []  Abnormal  [x]  Normal  []  Strain to void   []  Weak detrusor  Void:   [x]  Typical   []  Atypical    Additional Notes: Multiple uninhibited contractions throughout UDS testing without leaking .   Uroflowmetry, cystometry, and pressure / flow study were completed using calibrated electronic equipment and air charged transducers.    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

## 2024-08-09 ENCOUNTER — OFFICE VISIT (OUTPATIENT)
Dept: NEUROLOGY | Facility: CLINIC | Age: 29
End: 2024-08-09
Payer: MEDICAID

## 2024-08-09 VITALS
BODY MASS INDEX: 28 KG/M2 | SYSTOLIC BLOOD PRESSURE: 126 MMHG | RESPIRATION RATE: 16 BRPM | DIASTOLIC BLOOD PRESSURE: 72 MMHG | HEART RATE: 69 BPM | WEIGHT: 174 LBS

## 2024-08-09 DIAGNOSIS — N31.9 BLADDER DYSFUNCTION: ICD-10-CM

## 2024-08-09 DIAGNOSIS — R20.2 PARESTHESIA: ICD-10-CM

## 2024-08-09 DIAGNOSIS — G43.109 MIGRAINE WITH AURA AND WITHOUT STATUS MIGRAINOSUS, NOT INTRACTABLE: Primary | ICD-10-CM

## 2024-08-09 DIAGNOSIS — R90.82 WHITE MATTER ABNORMALITY ON MRI OF BRAIN: ICD-10-CM

## 2024-08-09 DIAGNOSIS — G95.0 SYRINX OF SPINAL CORD (HCC): ICD-10-CM

## 2024-08-09 PROCEDURE — 99215 OFFICE O/P EST HI 40 MIN: CPT | Performed by: STUDENT IN AN ORGANIZED HEALTH CARE EDUCATION/TRAINING PROGRAM

## 2024-08-09 RX ORDER — UBROGEPANT 100 MG/1
TABLET ORAL
Qty: 10 TABLET | Refills: 11 | Status: SHIPPED | OUTPATIENT
Start: 2024-08-09 | End: 2025-08-09

## 2024-08-09 RX ORDER — LAMOTRIGINE 100 MG/1
100 TABLET ORAL DAILY
Qty: 90 TABLET | Refills: 3 | Status: SHIPPED | OUTPATIENT
Start: 2024-08-09

## 2024-08-09 NOTE — PATIENT INSTRUCTIONS
-Disease monitoring  -May consider repeat MR brain, cervical, thoracic ~6/2025  -Disease modifying therapy  Continue vitamin d 3 supplement  -Symptomatic management  -Aim for at least 150 minutes of exercise per week  -For migraine:     -Continue lamotrigine 100 daily   -Ubrelvy for abortive             -For slow/incomplete bladder emptying: keep seeing urogynecology   -For syrinx and bladder symptoms: neurosurgery referral Dr. Deluca  -Continue following with neuro-ophthalmology, let me know if you need a referral  -EEG    Refill policies:    Allow 2-3 business days for refills; controlled substances may take longer.  Contact your pharmacy at least 5 days prior to running out of medication and have them send an electronic request or submit request through the “request refill” option in your [employer] account.  Refills are not addressed on weekends; covering physicians do not authorize routine medications on weekends.  No narcotics or controlled substances are refilled after noon on Fridays or by on call physicians.  By law, narcotics must be electronically prescribed.  A 30 day supply with no refills is the maximum allowed.  If your prescription is due for a refill, you may be due for a follow up appointment.  To best provide you care, patients receiving routine medications need to be seen at least once a year.  Patients receiving narcotic/controlled substance medications need to be seen at least once every 3 months.  In the event that your preferred pharmacy does not have the requested medication in stock (e.g. Backordered), it is your responsibility to find another pharmacy that has the requested medication available.  We will gladly send a new prescription to that pharmacy at your request.    Scheduling Tests:    If your physician has ordered radiology tests such as MRI or CT scans, please contact Central Scheduling at [phone] right away to schedule the test.  Once scheduled, the [facility] Centralized Referral Team  will work with your insurance carrier to obtain pre-certification or prior authorization.  Depending on your insurance carrier, approval may take 3-10 days.  It is highly recommended patients assure they have received an authorization before having a test performed.  If test is done without insurance authorization, patient may be responsible for the entire amount billed.      Precertification and Prior Authorizations:  If your physician has recommended that you have a procedure or additional testing performed the [facility] Centralized Referral Team will contact your insurance carrier to obtain pre-certification or prior authorization.    You are strongly encouraged to contact your insurance carrier to verify that your procedure/test has been approved and is a COVERED benefit.  Although the [facility] Centralized Referral Team does its due diligence, the insurance carrier gives the disclaimer that \"Although the procedure is authorized, this does not guarantee payment.\"    Ultimately the patient is responsible for payment.   Thank you for your understanding in this matter.  Paperwork Completion:  If you require FMLA or disability paperwork for your recovery, please make sure to either drop it off or have it faxed to our office at [phone]. Be sure the form has your name and date of birth on it.  The form will be faxed to our Forms Department and they will complete it for you.  There is a 25$ fee for all forms that need to be filled out.  Please be aware there is a 10-14 day turnaround time.  You will need to sign a release of information (BRENDA) form if your paperwork does not come with one.  You may call the Forms Department with any questions at [phone].  Their fax number is [phone].

## 2024-08-09 NOTE — PROGRESS NOTES
Neurology Office Visit    Katy Polo   Date of Birth 2/15/1995    Interval history August 09, 2024:   Feeling kind of tingly when she wakes up throughout whole body, could last for 30 minutes and self resolves. No other associated symptoms. No clear provoker. Less sharp mentally during episode, no loss of control voiding or stooling, tired during episode.     Seeing Dr Orlando for bladder symptoms, awaiting results urodynamics.     Neurologic review of systems:  -Cognition: some brain fog, trouble concentrating (started with autoimmune thing, now more intermittent)  -Fatigue: yes for years  -Depression/anxiety: has anxiety, takes meds, has a counselor  -Vision: visual snow throughout both eyes, pixilated vision, floaters, wears contacts. Gets migriane aura in one eye, could be either then migraine on other side. Some more spots in eyes/floaters, similar to migraine, but doesn't evolve to migraine- trace less sharp mentally.   -Dysphagia/dysarthria: no  -Motor: no ?trace right hand weakness intermittently see in coordination, right hand weakness off and on whole day occassionally, no trigger  -Sensory: some tingling in fingers usually on one hand, could be either hand  -Ambulation: no  -Balance: sometimes one side better than other, falls to one side at orange theory, can't recall which side, stable  -Bowel: no  -Bladder: no, usually feels fully empties bladder, sometimes takes awhile to void has been noticing more, seeing Dr. Orlando  -Lhermittes: no  -Reder crunch: no  -Dizziness: sometimes vertigo, sometimes disequilibrium without spinning, ongoing, random no positional component, last about a minute and self resolve. No associated aural fullness or ear ringing (at baseline sometimes one ear will get mute and then ringing will happen, separate from dizziness, very breif and self resolves). No diplopia associated with dizziness. similar  -Coordination: feels a bit clumsier (coaches softball, harder to catch  a ball, and trouble holding things in hand sometimes, right hand)  -Headaches as below    -Vitamin D: 29, 2023. 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    Per  neurology note \"Katy Polo is a 28 y.o. female. She has a past medical history of Anxiety (2012), Headache (migraines starting in 2011), and Visual impairment (nearsighted since 2005, visual snow 2022). Patient has a history of a 3 year (5825-3922) low-grade fever that has since resolved. She sought care at [facility], and was told that it was autoimmune in origin. Due to patients history with Visual Snow, patient underwent an MRI in Aug 2022 that was suspicious for MS. Patient describes symptoms for the past year of dizziness, numbness/tingling, fatigue at the end of the day, electrical shock sensation down the spine, urinary urgency, and dissociation. Additionally, patient reports feelings of difficulty grabbing items at the end of the day and heaviness in the limbs. Patient denied changes in bowel movements and hearing. She is currently on lamotrigine for her symptoms of Visual Snow, patient reports that her symptoms have been stable. \" Per 4/2024 note from , lesions of tin felt consistent with migraine history. MRI T spine with small syrinx, reassuring exam. Planned to repeat neuroimaging.     Per  ophthalmology note, patientw iht visual snow syndrome. Lamictal helping with migraines, not with visual snow, advsied continuing lamictal.     Migraine and visual snow history- similar to before, doesn't want to make any changes  -Onset ~13 years old  -Visual aura unilateral could be either eye, then migraine opposite side. Stabbing, photophobia and phonophobia, nauseas, vomiting. Some associated generalized weakness. No sensory sxs.   -Last for hours, needs to sleep  -Preventive: lamictal- helps with migraine frequency (used to have two per month, now one  per year on lamotrigine)  -prior Abortive: rizatriptan usually help  -Abortive; ubrelvy helps a lot    Per [facility] note, daily fevers since 2016 after tick bite (~100), associated pruritic erythematous rash at site o fbite rue improved with time. Treatment with doxy,no imrpovement in fevers. Had mild arthralgias. Considered adult onset still disease or periodic fever syndrome. Labs- neg lyme, neg spep, low iga normal igm and igg, nl ck, nl crp, nl ferritin, elevated parvovirus igg nl igm, neg rf, neg ssa and ssb, nl c3 and c4, marichuy if nl. Esr nl. Pursued work-up, no follow up notes seen.     Cannot seem to open records from [place].     Problem List:  Patient Active Problem List   Diagnosis    Other acne    Social anxiety disorder    Chronic fatigue and malaise    Tachycardia    Migraine with aura    Encounter for surveillance of contraceptive pills    Dysmenorrhea    Visual snow syndrome    Epidermal cyst of face       PMHx:  Past Medical History:    Acne    Anxiety state    Depression with anxiety    Heavy periods    Migraine    with aura     Migraines    Other acne    Tachycardia       PSHx:  Past Surgical History:   Procedure Laterality Date    Patient denies any surgical history         SocHx:  Social History     Socioeconomic History    Marital status: Single   Occupational History    Occupation:     Tobacco Use    Smoking status: Never    Smokeless tobacco: Never   Vaping Use    Vaping status: Never Used   Substance and Sexual Activity    Alcohol use: No    Drug use: No    Sexual activity: Never     Comment: never been sexual active    Other Topics Concern    Pt has a pacemaker No    Pt has a defibrillator No    Reaction to local anesthetic No    Blood Transfusions No    Caffeine Concern Yes     Comment: once weekly    Exercise Yes   Social History Narrative    PATIENT DENIES ANY H/X abuse.     Pt lives with parents.    Pt is a .    Professional musician.     Family History:  Family  History   Problem Relation Age of Onset    Hypertension Mother     Obesity Mother     Heart Disorder Maternal Grandfather     Cancer Maternal Grandfather     Skin cancer Maternal Grandfather     Prostate Cancer Father        Current Medications:  Current Outpatient Medications   Medication Sig Dispense Refill    ubrogepant (UBRELVY) 100 MG Oral Tab Take one tablet at onset of migraine.  May take additional tablet in 2 hours if needed.  Do not exceed two tablets per 24 hour period. 10 tablet 11    lamoTRIgine 100 MG Oral Tab Take 1 tablet (100 mg total) by mouth daily. 90 tablet 3    escitalopram 20 MG Oral Tab Take 1 tablet (20 mg total) by mouth daily. 90 tablet 0    spironolactone 100 MG Oral Tab Take 1 tablet (100 mg total) by mouth daily. 90 tablet 3     Objective/Physical Exam:    Vital Signs:  Blood pressure 126/72, pulse 69, resp. rate 16, weight 174 lb (78.9 kg), last menstrual period 07/21/2024, not currently breastfeeding.  General: Alert, good recall of personal medical history    Respiratory: Non labored breathing on room air    Cardiovascular: Regular rate    Mental status: Alert, oriented, language fluent, comprehension intact    Cranial nerves: Optic disc margins sharp VF full to confrontation bilaterally. Pupils equal, round, equally reactive to light and accommodation. Extraocular eye movements intact without nystagmus. Face sensation intact to light touch. Face strength symmetric and intact. No dysarthria.    Motor:   Power:   -Right upper extremity: shoulder abductors 5, elbow extensors 5, elbow flexors 5, wrist extensors 5, finger extension 5, finger flexion 5, finger abduction 5  -Left upper extremity: shoulder abductors 5, elbow extensors 5, elbow flexors 5, wrist extensors 5, finger extension 5, finger flexion 5, finger abduction 5  -Right lower extremity: hip flexion 5, knee extension 5, dorsiflexion 5  -Left lower extremity: hip flexion 5, knee extension 5, dorsiflexion 5  Tone: Normal    Bulk: Normal  Abnormal movements: None    Sensory: Intact to light touch and vibration >20s in distal extremities.    Coordination: Finger to nose and heel to shin intact.    Reflexes: Right/Left: Biceps 2/2, triceps 2/2, brachioradialis 2/2, hoffmans negative. Patella 2+/2+, achilles 2+/2+. Mute plantars, negative clonus.     Gait: Narrow based, symmetric arm swing, no gait assist. Able to tandem.     Labs:   2023 UI  MARLENE neg, ssa ssb neg, dsdna nl. Ace nl, b12 220, lysocyme nl, sed rate nl, crp nl, borrelia nl, smith ab neg, lupus ac neg, tsh and t4 nl, mog ab <1:10   Aqp4 non cell based     Imaging:  Per reports UI 2023  Brain wwo 2023  Findings: The ventricles and cortical sulci are in proportion and consistent with the patient's stated age 28 years. There is no evidence of intraparenchymal hemorrhage, abnormal  diffusion or abnormal contrast enhancement.     There is redemonstration of multiple foci of increased T2 signal within the bilateral cerebral deep and subcortical white matter, not significantly changed from the prior examination. There are 2 periventricular lesions inferolateral and superior to the right frontal horn (series 800 image 75 and 71), unchanged.   There are no lesions within the corpus callosum or along the callososeptal interface. No cortical or juxtacortical lesion is seen on the double inversion recovery images.   There is a questionable signal abnormality in the dorsal jaqueline, right of the midline (series 6 image 8, series 800 image 112) which could represent artifact. No additional signal abnormality is the seen at the brainstem or cerebellum.    There is no abnormal postcontrast enhancement.     There is no mass effect, midline shift, or extra-axial collection. The craniocervical junction is not compromised.     The orbits are symmetric without proptosis. The paranasal sinuses and mastoid air cells are well pneumatized. The dural venous sinuses are patent. The flow-voids and contrast  enhancement of the large intracranial vessels are normal. The bony calvarium is intact.   Redemonstration of multiple scattered white matter lesions, nonspecific.   (The lesions do not meet dissemination in time or in space as they mostly within the deep and subcortical white matter with only 2 periventricular lesions, no juxtacortical/cortical lesion, questionable lesion along the dorsal jaqueline right of the midline, indeterminate and could also represent artifact. No evidence of abnormal enhancement or new lesion compared to the prior study)     Cervical wwo 2023  There is normal alignment of the cervical spine. The vertebral body and disc heights are maintained. There is normal bone marrow and disc signal and enhancement. The craniocervical junction is not compromised. There is no spinal canal or neural foraminal stenosis. The cervical cord has normal caliber and signal. There is no abnormal cord enhancement. The prevertebral soft tissues are unremarkable.     _______________________   IMPRESSION:     MRI cervical spine without and with contrast is negative for demyelinating plaque or active demyelination on contrast-enhanced images.     Thoracic wwo 2023  The alignment of thoracic segments preserves a customary smoothly rounded thoracic kyphosis.     The thoracic spinal cord shows a central hydromyelia extending from approximate T9 level in the central spinal cord to approximate T12 level and thereafter redemonstrated just above the conus at L1-L2. The thoracic spinal cord including fluid sensitive and contrast-enhanced views is negative for demonstration of plaque formation or active demyelination.     The lower thoracic segments at T12/L1 shows posterior broad-based disc bulge without neural compromise on thoracic cord or exiting nerve roots at that level.     Thoracic segments are negative for bone marrow infiltration, fracture deformity or bone destruction. The discs including vertebral body endplates are  normal at all levels in shape and signal with the exception of T12/L1.     ______________________   IMPRESSION:     1. Thoracic spine MR including contrast-enhanced views shows central syringo/hydromyelia in the lower thoracic spinal cord.     2. Findings are negative for demonstration of demyelinating plaque formation or active demyelination in the thoracic spinal cord on contrast-enhanced views.     MRI SPINE THORACIC (W+WO) (CPT=72157)    Result Date: 7/24/2024  CONCLUSION:  1. There is a syrinx in the mid to lower thoracic cord that measures up to 2 mm maximal diameter and spans the T6-T12 levels.  No associated thoracic cord mass or suspicious postcontrast enhancement.  Overall, this appears unchanged since prior outside institution thoracic spine MR from April, 2023. 2. No other suspicious foci of signal abnormality or enhancement throughout the thoracic cord to suggest demyelinating disease. 3. Please note that due to technical factors, axial T1 weighted pre and postcontrast images of the thoracic spine were incorrectly performed.  Despite this, there is no suspicious enhancement on the sagittal T1 weighted postcontrast sequence. 4. 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 5. Lesser incidental findings as above.     Dictated by (CST): Lonny Roberts MD on 7/24/2024 at 9:18 AM     Finalized by (CST): Lonny Roberts MD on 7/24/2024 at 9:31 AM          MRI SPINE CERVICAL (W+WO) (CPT=72156)    Result Date: 7/24/2024  CONCLUSION:  1. Normal caliber and signal characteristics of the cervical spinal cord.  No suspicious postcontrast enhancement throughout the cervical spine. 2. Minimal disc related degenerative changes at C3-C4.  No other significant degenerative changes or neural compromise throughout the cervical spine.   Dictated by (CST): Lonny Roberts MD on 7/24/2024 at 9:12 AM     Finalized by (CST): Lonny Roberts MD on 7/24/2024 at 9:17 AM          MRI BRAIN (W+WO) + PERFUSION  SUSAN(CPT=70553)    Result Date: 6/27/2024  CONCLUSION:   1. Minimal FLAIR abnormalities the white matter are stable and possibly due to sequelae of migraine headaches.  Sequelae of developing demyelinating process cannot be excluded.  Sequelae of early chronic small vessel ischemic disease is of consideration as well.  2. No enhancing lesions.  3. No perfusion abnormalities.   LOCATION:  [facility]    Dictated by (CST): Maxi Cho MD on 6/27/2024 at 3:24 PM     Finalized by (CST): Maxi Cho MD on 6/27/2024 at 3:29 PM         Assessment:  Katy Polo is a(n) 29 year old female with a medical history of migraine with aura, visual snow, thoracic syringomyelia, history of unexplained fevers and joint pain in past (spont resolved), and white matter lesions of brain who presents for white matter lesion monitoring. Reports intermittent brief episodes of vertigo without associated sxs, incomplete bladder emptying, episodic full body tingling in AM lasting for minutes and self resolving, and rare spots in vision.     Migraines well controlled on lamotrigine (tolerating) and Ubrelvy.     MR brain 2024 similar to prior with white matter lesions non specific bilateral cerebral deep and white matter similar to 2023, two periventricular lesions, and lesion vs artifact in jaqueline. MR C spine no cord lesions, mild degenerative changes. MR T spine 2024 with central syringo/hydromyelia lower cord similar to prior. Exam with increased reflexes in legs. Will seek opinion from neurosurgery Dr. Deluca re thoracic syrinx in light of bladder symtoms (being worked up with urogynecology) and increased reflexes). Will pursue eeg for diffuse tingling episodes with confusions.     Plan:  -Disease monitoring  -May consider repeat MR brain, cervical, thoracic ~6/2025  -Disease modifying therapy  Continue vitamin d 3 supplement  -Symptomatic management  -Aim for at least 150 minutes of exercise per week  -For migraine:     -Continue  lamotrigine 100 daily   -Ubrelvy for abortive             -For slow/incomplete bladder emptying: keep seeing urogynecology   -For syrinx and bladder symptoms: neurosurgery referral Dr. Deluca  -Continue following with neuro-ophthalmology, let me know if you need a referral  -EEG    1. Migraine with aura and without status migrainosus, not intractable  - ubrogepant (UBRELVY) 100 MG Oral Tab; Take one tablet at onset of migraine.  May take additional tablet in 2 hours if needed.  Do not exceed two tablets per 24 hour period.  Dispense: 10 tablet; Refill: 11    2. Syrinx of spinal cord (HCC)  - Neurosurgery Referral - In Network    3. Bladder dysfunction  - Neurosurgery Referral - In Network    4. Paresthesia  - EEG; Future    5. White matter abnormality on MRI of brain    Total time 42 minutes including chart review, eliciting history, physical exam, and counseling.     Jacqui Block, DO

## 2024-08-15 ENCOUNTER — VIRTUAL PHONE E/M (OUTPATIENT)
Dept: UROLOGY | Facility: HOSPITAL | Age: 29
End: 2024-08-15
Attending: OBSTETRICS & GYNECOLOGY
Payer: MEDICAID

## 2024-08-15 DIAGNOSIS — N32.81 DETRUSOR INSTABILITY: Primary | ICD-10-CM

## 2024-08-15 DIAGNOSIS — R39.14 FEELING OF INCOMPLETE BLADDER EMPTYING: ICD-10-CM

## 2024-08-15 NOTE — PROGRESS NOTES
Pt presents w/ initial c/o sense of incomplete bladder emptying   Urodynamic testing undergone without complication.  Results reviewed with patient via telephone  This visit is being conducted as a televisit with pt's consent.  Pt in safe, private environment for televisit, provider located in the office setting    80/3cc & 295/1cc  [facility] [place]  DO @ 59cc  No GILSON    Discussed with patient mgmt options for DO/UUI  Biggest bother is sense of incomplete bladder emptying    Discussed dietary and behavioral modifications for mgmt of urinary symptoms  Discussed weight management and benefits of weight loss on urinary symptoms  Reviewed AUA/SUFU guidelines on mgmt of non-neurogenic OAB  Discussed pharmacologic and nonpharmacologic mgmt options of urinary symptoms - reviewed risks, benefits, alternatives, and goals of treatment  Discussed specific risks related to OAB meds including, but not limited to dry mouth, constipation, blurry vision, cognitive changes, and BP elevation.    Call with s/sx of UTI  Bowel mgmt    All questions answered.  Pt will proceed bladder diet/drill, pelvic floor exercises  Consider PFPT or OABs meds prn sx    Follow up prn    She understands and agrees to plan    Dr. Leatha Orlando

## 2024-08-15 NOTE — PATIENT INSTRUCTIONS
[email or web address]  [email or web address]    [email or web address]    [email or web address]  [email or web address]    [email or web address]    [email or web address]_Approaches_[email or web address]_[email or web address]

## 2024-08-16 ENCOUNTER — PROCEDURE VISIT (OUTPATIENT)
Dept: NEUROLOGY | Facility: CLINIC | Age: 29
End: 2024-08-16

## 2024-08-16 ENCOUNTER — HOSPITAL ENCOUNTER (OUTPATIENT)
Dept: ELECTROPHYSIOLOGY | Facility: HOSPITAL | Age: 29
Discharge: HOME | End: 2024-08-16
Attending: STUDENT IN AN ORGANIZED HEALTH CARE EDUCATION/TRAINING PROGRAM
Payer: MEDICAID

## 2024-08-16 DIAGNOSIS — G43.109 MIGRAINE WITH AURA AND WITHOUT STATUS MIGRAINOSUS, NOT INTRACTABLE: Primary | ICD-10-CM

## 2024-08-16 DIAGNOSIS — R20.2 PARESTHESIA: ICD-10-CM

## 2024-08-16 PROCEDURE — 95819 EEG AWAKE AND ASLEEP: CPT

## 2024-08-16 PROCEDURE — 95819 EEG AWAKE AND ASLEEP: CPT | Performed by: OTHER

## 2024-08-16 NOTE — PROCEDURES
ELECTROENCEPHALOGRAM REPORT      Patient Name: Katy Polo  Chart ID: [id number]  Ordering Physician: Suzanna Hidalgo MD Date of Test: 8/16/2024  Patient Diagnosis:   Encounter Diagnoses   Name Primary?    Migraine with aura and without status migrainosus, not intractable Yes    Paresthesia      HISTORY  30 YO FEMALE PRESENTS TO NEURO FOR WHITE MATTER LESION MONITORING, REPORTS INTERMITTENT BRIEF EPISODES OF VERTIGO, EPISODIC FULL BODY TINGLING IN AM LASTING MINUTES AND SELF RESOLVING AND RARE SPOTS IN VISION  PMH: MIGRAINES W AURA, TACHYCARDIA  MED: Lamotrigine, escitalopram    TECHNICAL SUMMARY  1. 10-20 International System.  2.  Bipolar and monopolar recording.  3.  Routine recording (awake and asleep).  4.  Portable - C.E.S.  5.  Impedance - 10 kilohms or less.    A routine EEG was obtained.  No sedation was given.    BACKGROUND:   Awake:   During wakefulness a well developed, reactive, posterior dominant rhythm consisting of 8-9 hertz potentials of medium amplitude is seen symmetrically.  Low voltage beta activity is seen with a frontal predominance.      Sleep:   Stage I and II demonstrated.       EPILEPTIFORM ABNORMALITIES:    Generalized sharps with bifrontal predominance seen frequently both during awake and sleep state. Findings suspicious for seizure discharges.      ACTIVATION PROCEDURES:   Photic stimulation did not induced any abnormal responses      IMPRESSION:   This is an abnormal EEG study showing generalized sharps with bifrontal predominance suspicious for seizure activity.  A repeat or extended EEG would be recommended and clinical correlation is advised.      Thank you for allowing us to participate in your patient's care.      Suzanna Hidalgo MD  [facility]    This note was prepared using Dragon [employer] voice recognition dictation software. As a result errors may occur. When identified these errors have been corrected. While every attempt is made to correct errors  during dictation discrepancies may still exist

## 2024-08-20 ENCOUNTER — PATIENT MESSAGE (OUTPATIENT)
Dept: NEUROLOGY | Facility: CLINIC | Age: 29
End: 2024-08-20

## 2024-08-20 DIAGNOSIS — R29.818 TRANSIENT NEUROLOGIC DEFICIT: Primary | ICD-10-CM

## 2024-08-20 DIAGNOSIS — R41.0 TRANSIENT CONFUSION: ICD-10-CM

## 2024-08-20 NOTE — TELEPHONE ENCOUNTER
From: Katy Polo  To: Jacqui Kathleen  Sent: 8/20/2024 12:08 PM CDT  Subject: EEG result    Hey Dr. Kathleen,   I was wondering if you had seen the result from the EEG. Seemed like it came back abnormal? So I was wondering what your thoughts were.

## 2024-08-20 NOTE — TELEPHONE ENCOUNTER
Spoke to Katy raymond EEG showing generalized sharps and suggestive of heightened seizure risk. She reports having episodes of generalized tingling and confusion most mornings, and some brief episodes of word finding troubles. Will pursue prolonged 48 hour eeg to aim to capture an episode. If symptoms worsening she is to let me know, low threshold to start empiric anti-seizure medication.     Jacqui Kathleen, DO       Eeg 8/2024  IMPRESSION:   This is an abnormal EEG study showing generalized sharps with bifrontal predominance suspicious for seizure activity.  A repeat or extended EEG would be recommended and clinical correlation is advised.

## 2024-08-23 ENCOUNTER — OFFICE VISIT (OUTPATIENT)
Dept: SURGERY | Facility: CLINIC | Age: 29
End: 2024-08-23
Payer: MEDICAID

## 2024-08-23 VITALS
HEART RATE: 70 BPM | DIASTOLIC BLOOD PRESSURE: 79 MMHG | BODY MASS INDEX: 27.32 KG/M2 | SYSTOLIC BLOOD PRESSURE: 128 MMHG | WEIGHT: 170 LBS | HEIGHT: 66 IN

## 2024-08-23 DIAGNOSIS — G95.0 SYRINX OF SPINAL CORD (HCC): Primary | ICD-10-CM

## 2024-08-23 PROCEDURE — 99203 OFFICE O/P NEW LOW 30 MIN: CPT | Performed by: NEUROLOGICAL SURGERY

## 2024-08-23 NOTE — PROGRESS NOTES
Neurosurgery Clinic Visit  2024    Katy Polo PCP:  Lidia Garza MD    2/15/1995 MRN [id number]       CHIEF COMPLAINT:  Chief Complaint   Patient presents with    Consult     New patient - Syrinx in thoracic region  Referred by Dr. Kathleen  Symptoms: Occasional tingling the entire body, dropping things, frequent bladder - had urology testing -abnormal        HISTORY OF PRESENT ILLNESS:  Katy Polo is a(n) 29 year old female presents today for neurosurgical consultation.  She has been diagnosed with a thoracic syrinx.    She reports episodic tingling and weakness throughout her body.  These episodes occur more or less once a day.  They are more common in the morning.  No provoking event.  They last about 1 or 2 minutes each.  This has started over the past year or so.    She also has some sense of urinary urgency, and feels that she is unable to completely empty her bladder.  She reports some visual changes, which she describes as \"snow.\"    She has a history of migraines.  She takes Lamictal for this.  She also takes Lexapro for anxiety, and spironolactone for acne.    REVIEW OF SYSTEMS:  The 12 point checklist was reviewed and was negative except: As noted in HPI    ALLERGIES:  No Known Allergies    MEDICATIONS:  Current Outpatient Medications   Medication Sig Dispense Refill    ubrogepant (UBRELVY) 100 MG Oral Tab Take one tablet at onset of migraine.  May take additional tablet in 2 hours if needed.  Do not exceed two tablets per 24 hour period. 10 tablet 11    lamoTRIgine 100 MG Oral Tab Take 1 tablet (100 mg total) by mouth daily. 90 tablet 3    escitalopram 20 MG Oral Tab Take 1 tablet (20 mg total) by mouth daily. 90 tablet 0    spironolactone 100 MG Oral Tab Take 1 tablet (100 mg total) by mouth daily. 90 tablet 3       HISTORY:  Past Medical History:    Acne    Anxiety state    Depression with anxiety    Heavy periods    Migraine    with aura     Migraines    Other acne    Tachycardia      Past Surgical History:   Procedure Laterality Date    Patient denies any surgical history       Family History   Problem Relation Age of Onset    Hypertension Mother     Obesity Mother     Heart Disorder Maternal Grandfather     Cancer Maternal Grandfather     Skin cancer Maternal Grandfather     Prostate Cancer Father      Katy  reports that she has never smoked. She has never used smokeless tobacco. She reports that she does not drink alcohol and does not use drugs.    PHYSICAL EXAMINATION:  Vital Signs:  /79   Pulse 70   Ht 66\"   Wt 170 lb (77.1 kg)   LMP 07/21/2024   BMI 27.44 kg/m²   Examination, cranial nerves are intact.  2-3 beats of nystagmus on extreme left lateral gaze.  Strength is 5/5 throughout.  Reflexes are 1+ in the uppers and 2+ in the lowers.  No Chanel's or clonus.    REVIEW OF STUDIES:  MRI scans of the cervical and thoracic spine personally reviewed.  The MRI of the thoracic spine demonstrates a prominent central canal, measuring up to 2 mm in diameter.  No enhancing lesion.      ASSESSMENT AND PLAN:  1.  Thoracic syrinx/prominent central canal.  Very small, and represents an incidental finding.  In all likelihood, this is a normal variant.  To be sure, I would like to see the patient back in about a year, with a new MRI scan of the thoracic spine, without contrast.  No indication for intervention.    EEG and urodynamic study results noted.  The patient will follow-up with Dr. Kathleen.  I would be happy to see her back sooner, if there is any change in her clinical condition.    Time spent on counseling/coordination of care:  15 Minutes    Total Time spent with patient:  15 Minutes        8/23/2024  11:33 AM

## 2024-09-10 ENCOUNTER — NURSE ONLY (OUTPATIENT)
Dept: ELECTROPHYSIOLOGY | Facility: HOSPITAL | Age: 29
End: 2024-09-10
Attending: STUDENT IN AN ORGANIZED HEALTH CARE EDUCATION/TRAINING PROGRAM
Payer: MEDICAID

## 2024-09-10 DIAGNOSIS — R29.818 TRANSIENT NEUROLOGIC DEFICIT: ICD-10-CM

## 2024-09-10 DIAGNOSIS — R41.0 TRANSIENT CONFUSION: ICD-10-CM

## 2024-09-10 PROCEDURE — 95700 EEG CONT REC W/VID EEG TECH: CPT

## 2024-09-10 PROCEDURE — 95709 EEG W/O VID EA 12-26HR INTMT: CPT

## 2024-09-11 ENCOUNTER — APPOINTMENT (OUTPATIENT)
Dept: ELECTROPHYSIOLOGY | Facility: HOSPITAL | Age: 29
End: 2024-09-11
Attending: STUDENT IN AN ORGANIZED HEALTH CARE EDUCATION/TRAINING PROGRAM
Payer: MEDICAID

## 2024-09-11 PROCEDURE — 95709 EEG W/O VID EA 12-26HR INTMT: CPT

## 2024-09-12 ENCOUNTER — NURSE ONLY (OUTPATIENT)
Dept: ELECTROPHYSIOLOGY | Facility: HOSPITAL | Age: 29
End: 2024-09-12
Attending: STUDENT IN AN ORGANIZED HEALTH CARE EDUCATION/TRAINING PROGRAM
Payer: MEDICAID

## 2024-09-12 PROBLEM — R41.0 TRANSIENT CONFUSION: Status: ACTIVE | Noted: 2024-09-12

## 2024-09-12 PROBLEM — R29.818 TRANSIENT NEUROLOGIC DEFICIT: Status: ACTIVE | Noted: 2024-09-12

## 2024-09-20 ENCOUNTER — PATIENT MESSAGE (OUTPATIENT)
Dept: NEUROLOGY | Facility: CLINIC | Age: 29
End: 2024-09-20

## 2024-09-20 NOTE — TELEPHONE ENCOUNTER
From: Katy Polo  To: Jacqui Kathleen  Sent: 9/20/2024 10:00 AM CDT  Subject: 48 hr eeg    Hey Dr. Kathleen,  I wondered if the results of my 48hr EEG have come in yet? It’s been a week since I turned it in so I thought I’d check.

## 2024-09-24 RX ORDER — LAMOTRIGINE 150 MG/1
TABLET ORAL
Qty: 180 TABLET | Refills: 1 | Status: SHIPPED | OUTPATIENT
Start: 2024-09-24

## 2024-09-24 NOTE — TELEPHONE ENCOUNTER
Katy reports she had a few brief episodes of vertigo and generalized tingling during eeg.     Reviewed EEG with Dr Duke who felt had bursts generalized slowing, not 100% epileptiform, may be slowing during state change    48 hour EEG  IMPRESSION:  This EEG is an abnormal study recorded in the awake and asleep states. There were multiple episodes of generalized poly sharp activity with a bifrontal predominance are see. This was more common in the beginning of the record on day one. These episodes lasted-1.5 seconds and did not develop into seizures. These short lasting bursts of sharp activity were seen overnight and into the second day of recording as well but were less frequent as the recording went on,. There were 3 patient push button event, but non of these correlated with any seizure activity and the EEG was normal during all of these events. This exam raises the suspicion of a lowered seizure threshold and clinical correlation suggested.  No seizures are recorded.      Report covers  Start 9/10/24 at 0941  End 9/12/24 at 0808      Impression: Unclear etiology of brief episodes of vertigo and full body tingling. Per review of EEG with epileptologist, EEG without clear epileptiform discharges. Will trial uptitration of lamotrigine (indication migraine) to see whether episode frequency improves. Will seek opinion from epileptologist Dr Duke (will ask nursing to reach out to her to help arrange).     -Nursing please help arrange appointment with Dr Duke for opinion re episodes of full body tingling and vertigo, epileptic?   -Advised Katy increase lamotrigine as follows:    Week 1: take 100 mg in the AM (use old/current supply) and 150 mg in the PM. Week 2 and beyond (if tolerating): take 150 mg twice daily. Stop if you develop a rash.     Jacqui Kathleen, DO

## 2024-09-25 ENCOUNTER — OFFICE VISIT (OUTPATIENT)
Facility: CLINIC | Age: 29
End: 2024-09-25
Payer: MEDICAID

## 2024-09-25 VITALS
HEART RATE: 78 BPM | BODY MASS INDEX: 27 KG/M2 | WEIGHT: 169 LBS | DIASTOLIC BLOOD PRESSURE: 76 MMHG | SYSTOLIC BLOOD PRESSURE: 122 MMHG | RESPIRATION RATE: 16 BRPM

## 2024-09-25 DIAGNOSIS — R94.01 ABNORMAL EEG: Primary | ICD-10-CM

## 2024-09-25 PROCEDURE — 99214 OFFICE O/P EST MOD 30 MIN: CPT | Performed by: OTHER

## 2024-09-25 NOTE — PROGRESS NOTES
Neurology History & Physical     ASSESSMENT & PLAN:      ICD-10-CM    1. Abnormal EEG  R94.01         I advised I don't believe the EEG is necessarily significantly abnormal.  Based on her symptoms, I don't suspect epilepsy or seizures, and would not recommend further evaluation or treatment from that standpoint.  She was recommended to increase LTG, I advised this is reasonable for symptomatic control, but not required from my standpoint.      We discussed medication side effects and activity precautions. We discussed symptoms that would warrant urgent/emergent evaluation. Patient verbalized understanding and agreement.    No follow-ups on file.  She will f/u with Dr. Kathleen.       ~~~~~~~~~~~~~~~~~~~~~~~~~~~    CHIEF COMPLAINT / REASON FOR VISIT:    Chief Complaint   Patient presents with    Neurologic Problem     Second opinion visit for whole body tingling. Completed EEGs.      HISTORY OBTAINED FROM:  Patient and others as above  Chart review    HISTORY:  Katy Polo is a 29 year old female who initially was evaluated for visual snow, apparently had MRI with lesions and thus ended up seeing MS specialist, but were not concerned for MS.  She has been having events when imbalance and incoordination.  Started seeing Dr. Kathleen and MRIs have been stable.  Has been also having whole body and face tingling and gen weakness, a few times a week, lasting 1 minutes.  No altered awareness.  On LTG for migraines and visual snow (does actually stop her migraines).    Epilepsy risk factors:  Normal birth and development   No febrile or childhood seizures   No meningitis/encephalitis  No head trauma with prolonged LOC/amnesia, had 1 mild concussion in high school volleyball   No known structural brain lesions  No FH of seizures    DATA REVIEWED:  As documented in the history    aEEG ([facility]) - 48 hr study - discharges of unclear significance; (I independently analyzed and interpreted this, and I believe these could be a normal  variant.)    MRI brain unremarkable     PHYSICAL EXAMINATION:  /76   Pulse 78   Resp 16   Wt 169 lb (76.7 kg)   LMP 07/21/2024   BMI 27.28 kg/m²     Gen: in NAD  MSE: nl attn/conc, nl language, nl fund of knowledge  CN: EOMI, VFF, nl facial mvmt, nl palate, nl tongue  Motor: 5/5 x4  DTR: 2+ BUE and BLE  Coord: nl FTN b/I  Gait: normal    No Known Allergies    Current medications:   lamoTRIgine 150 MG Oral Tab Week 1: take 100 mg in the AM (use old/current supply) and 150 mg in the PM. Week 2 and beyond (if tolerating): take 150 mg twice daily. Stop if you develop a rash. 180 tablet 1    ubrogepant (UBRELVY) 100 MG Oral Tab Take one tablet at onset of migraine.  May take additional tablet in 2 hours if needed.  Do not exceed two tablets per 24 hour period. 10 tablet 11    escitalopram 20 MG Oral Tab Take 1 tablet (20 mg total) by mouth daily. 90 tablet 0    spironolactone 100 MG Oral Tab Take 1 tablet (100 mg total) by mouth daily. 90 tablet 3       Past Medical History:    Acne    Anxiety state    Depression with anxiety    Heavy periods    Migraine    with aura     Migraines    Other acne    Tachycardia       Past Surgical History:   Procedure Laterality Date    Patient denies any surgical history         Social History     Socioeconomic History    Marital status: Single   Occupational History    Occupation:     Tobacco Use    Smoking status: Never    Smokeless tobacco: Never   Vaping Use    Vaping status: Never Used   Substance and Sexual Activity    Alcohol use: No    Drug use: No    Sexual activity: Never     Comment: never been sexual active    Other Topics Concern    Pt has a pacemaker No    Pt has a defibrillator No    Reaction to local anesthetic No    Blood Transfusions No    Caffeine Concern No     Comment: once weekly    Exercise Yes     Comment: 3x per week [employer] classes, softball 4x per week       Family History   Problem Relation Age of Onset    Hypertension Mother      Obesity Mother     Heart Disorder Maternal Grandfather     Cancer Maternal Grandfather     Skin cancer Maternal Grandfather     Prostate Cancer Father      Anthony Galeano MD, FAES, FAAN  Board-Certified in Neurology, Epilepsy, and Clinical Neurophysiology  [facility]

## 2024-10-10 RX ORDER — ESCITALOPRAM OXALATE 20 MG/1
20 TABLET ORAL DAILY
Qty: 90 TABLET | Refills: 3 | Status: SHIPPED | OUTPATIENT
Start: 2024-10-10

## 2024-10-10 NOTE — TELEPHONE ENCOUNTER
Refill Per Protocol     Requested Prescriptions   Pending Prescriptions Disp Refills    ESCITALOPRAM 20 MG Oral Tab [Pharmacy Med Name: ESCITALOPRAM 20MG TABLETS] 90 tablet 0     Sig: TAKE 1 TABLET(20 MG) BY MOUTH DAILY       Psychiatric Non-Scheduled (Anti-Anxiety) Passed - 10/10/2024 11:37 AM        Passed - In person appointment or virtual visit in the past 6 mos or appointment in next 3 mos     Recent Outpatient Visits              2 weeks ago Abnormal EEG    [facility] Anthony Galeano MD    Office Visit    4 weeks ago [facility]    [facility]    1 month ago [facility]    1 month ago [facility]x [facility] spinal cord [facility]    [facility]    1 month ago Detrusor instability    [facility] Leatha Orlando DO    Virtual Phone E/M                      Passed - Depression Screening completed within the past 12 months                 Recent Outpatient Visits              2 weeks ago Abnormal EEG    [facility] Anthony Galeano MD    Office Visit    4 weeks ago [facility]ic [facility]    [facility]    1 month ago Transient neurologic deficit    [facility] EEG    Nurse Only    1 month ago Syrinx of spinal cord [facility]    [facility] Visit    1 month ago Detrusor instability    [facility] Leatha Orlando DO    Virtual Phone E/M

## 2024-12-04 ENCOUNTER — OFFICE VISIT (OUTPATIENT)
Dept: DERMATOLOGY CLINIC | Facility: CLINIC | Age: 29
End: 2024-12-04
Payer: MEDICAID

## 2024-12-04 DIAGNOSIS — L65.0 TELOGEN EFFLUVIUM: Primary | ICD-10-CM

## 2024-12-04 DIAGNOSIS — L64.9 ANDROGENETIC ALOPECIA: ICD-10-CM

## 2024-12-04 PROCEDURE — 99214 OFFICE O/P EST MOD 30 MIN: CPT | Performed by: STUDENT IN AN ORGANIZED HEALTH CARE EDUCATION/TRAINING PROGRAM

## 2024-12-04 RX ORDER — LAMOTRIGINE 100 MG/1
TABLET ORAL
COMMUNITY
Start: 2024-11-18

## 2024-12-04 RX ORDER — MINOXIDIL 2.5 MG/1
1.25 TABLET ORAL DAILY
Qty: 45 TABLET | Refills: 1 | Status: SHIPPED | OUTPATIENT
Start: 2024-12-04

## 2024-12-04 NOTE — PROGRESS NOTES
Established Patient    Referred by: No referring provider defined for this encounter.    CHIEF COMPLAINT: Hairloss/Hair Thinning    HISTORY OF PRESENT ILLNESS: Katy Polo is a 29 year old female here for evaluation Hair loss.    1. Hair loss/Hair Thinning  Location: All over  Duration: 6 months  Signs and symptoms: Hair loss noticing on hair brush and in the shower  Current treatment: None  Past treatments: None      Personal Dermatologic History  History of skin cancer: No  History of  atypical moles: No    FAMILY HISTORY:  History of melanoma: No    Past Medical History  Past Medical History:    Acne    Anxiety state    Depression with anxiety    Heavy periods    Migraine    with aura     Migraines    Other acne    Tachycardia       REVIEW OF SYSTEMS:  Constitutional: Denies fever, chills, unintentional weight loss.   Skin as per HPI    Medications  Current Outpatient Medications   Medication Sig Dispense Refill    escitalopram 20 MG Oral Tab Take 1 tablet (20 mg total) by mouth daily. 90 tablet 3    lamoTRIgine 150 MG Oral Tab Week 1: take 100 mg in the AM (use old/current supply) and 150 mg in the PM. Week 2 and beyond (if tolerating): take 150 mg twice daily. Stop if you develop a rash. 180 tablet 1    ubrogepant (UBRELVY) 100 MG Oral Tab Take one tablet at onset of migraine.  May take additional tablet in 2 hours if needed.  Do not exceed two tablets per 24 hour period. 10 tablet 11    spironolactone 100 MG Oral Tab Take 1 tablet (100 mg total) by mouth daily. 90 tablet 3       PHYSICAL EXAM:  General: awake, alert, no acute distress  Neuropsych: appropriate mood and affect  Eyes: Sclerae anicteric, without conjunctival injection, eyelids unremarkable  Skin: Skin exam was performed today including the following: scalp. Pertinent findings include:   - with generalized thinning    ASSESSMENT & PLAN:  Pathophysiology of diagnoses discussed with patient.  Therapeutic options reviewed. Risks, benefits, and  alternatives discussed with patient. Instructions reviewed at length.    #Telogen Effluvium  #Androgenetic alopecia   - Recommended daily MVI and vitamin D 2000-5000IU   - Start oral minoxidil 1.25 mg daily (half a tablet) with food  - Discussed medication usage, dosage, risks, benefits and side effects.  ADR discusssed, including, but not limited to:  hypotension, tachycardia, edema, and increased facial/body hirsutism.      Return to clinic: 6 months or sooner if something concerning arises     Kiel Dee MD

## 2024-12-20 RX ORDER — SPIRONOLACTONE 100 MG/1
100 TABLET, FILM COATED ORAL DAILY
Qty: 90 TABLET | Refills: 3 | Status: SHIPPED | OUTPATIENT
Start: 2024-12-20

## 2024-12-20 NOTE — TELEPHONE ENCOUNTER
Refill passed per [facility] protocols.    Requested Prescriptions   Pending Prescriptions Disp Refills    SPIRONOLACTONE 100 MG Oral Tab [Pharmacy Med Name: SPIRONOLACTONE 100MG TABLETS] 90 tablet 3     Sig: TAKE 1 TABLET(100 MG) BY MOUTH DAILY       Hypertension Medications Protocol Passed - 12/20/2024  2:31 PM        Passed - CMP or BMP in past 12 months        Passed - Last BP reading less than 140/90     BP Readings from Last 1 Encounters:   09/25/24 122/76               Passed - In person appointment or virtual visit in the past 12 mos or appointment in next 3 mos     Recent Outpatient Visits              2 weeks ago Telogen effluvium    [facility] [place] Kiel Dee MD    Office Visit    2 months ago Abnormal EEG    [facility] Anthony Galeano MD    Office Visit    3 months ago Transient neurologic deficit    [facility] EEG    Nurse Only    3 months ago Transient neurologic deficit    [facility] EEG    Nurse Only    3 months ago Syrinx of spinal cord (HCC)    [facility], [place] Francisco Roa MD    Office Visit                      Passed - EGFRCR or GFRNAA > 50     GFR Evaluation  EGFRCR: 84 , resulted on 3/7/2024

## 2024-12-20 NOTE — TELEPHONE ENCOUNTER
Patient called in for status on refill. Patient will be going out of time and looking to  medication. Please advise

## 2025-03-24 ENCOUNTER — TELEPHONE (OUTPATIENT)
Dept: FAMILY MEDICINE CLINIC | Facility: CLINIC | Age: 30
End: 2025-03-24

## 2025-03-24 RX ORDER — LAMOTRIGINE 100 MG/1
100 TABLET ORAL DAILY
Qty: 90 TABLET | Refills: 0 | Status: SHIPPED | OUTPATIENT
Start: 2025-03-24 | End: 2025-06-23

## 2025-03-24 NOTE — TELEPHONE ENCOUNTER
Spoke with patient, Date of Birth verified  She was informed of MD recommendation, she stated understanding.  She is on  lamotrigine  100 mg every day   Warm transferred to hospitals staff for appt.    FYI      No future appointments.

## 2025-03-24 NOTE — TELEPHONE ENCOUNTER
Please let patient know I will prescribe this medication.  However please confirm dose as we had a different dose on the chart and be sure patient schedules routine physical within 90 days.

## 2025-03-24 NOTE — TELEPHONE ENCOUNTER
Per patient her Neurology prescribe her lamoTRIgine but per patient she don't go there anymore and wondering if Dr Garza can send that prescription medication to her pharmacy [facility] on file as soon as possible due to patient is out of medication.    Current Outpatient Medications   Medication Sig Dispense Refill           lamoTRIgine 100 MG Oral Tab

## 2025-05-27 RX ORDER — MINOXIDIL 2.5 MG/1
1.25 TABLET ORAL DAILY
Qty: 45 TABLET | Refills: 1 | OUTPATIENT
Start: 2025-05-27

## 2025-05-27 NOTE — TELEPHONE ENCOUNTER
Refill request has failed the Ambulatory Medication Refill Standing Order and is routed to the primary physician to review the following:    Requested Prescriptions     Refused Prescriptions Disp Refills    MINOXIDIL 2.5 MG Oral Tab [Pharmacy Med Name: MINOXIDIL 2.5MG TABLETS] 45 tablet 1     Sig: TAKE 1/2 TABLET(1.25 MG) BY MOUTH DAILY     Refused By: HÉCTOR BLANCO     Reason for Refusal: Appt required, please call patient

## 2025-05-28 ENCOUNTER — LAB ENCOUNTER (OUTPATIENT)
Dept: LAB | Age: 30
End: 2025-05-28
Attending: FAMILY MEDICINE
Payer: MEDICAID

## 2025-05-28 ENCOUNTER — OFFICE VISIT (OUTPATIENT)
Dept: FAMILY MEDICINE CLINIC | Facility: CLINIC | Age: 30
End: 2025-05-28
Payer: MEDICAID

## 2025-05-28 VITALS
DIASTOLIC BLOOD PRESSURE: 75 MMHG | BODY MASS INDEX: 24.62 KG/M2 | HEIGHT: 66.54 IN | HEART RATE: 77 BPM | OXYGEN SATURATION: 98 % | SYSTOLIC BLOOD PRESSURE: 112 MMHG | WEIGHT: 155 LBS

## 2025-05-28 DIAGNOSIS — G43.109 MIGRAINE WITH AURA AND WITHOUT STATUS MIGRAINOSUS, NOT INTRACTABLE: ICD-10-CM

## 2025-05-28 DIAGNOSIS — Z00.00 ROUTINE PHYSICAL EXAMINATION: ICD-10-CM

## 2025-05-28 DIAGNOSIS — T14.8XXA BRUISING: ICD-10-CM

## 2025-05-28 DIAGNOSIS — Z00.00 ROUTINE PHYSICAL EXAMINATION: Primary | ICD-10-CM

## 2025-05-28 DIAGNOSIS — N92.1 MENORRHAGIA WITH IRREGULAR CYCLE: ICD-10-CM

## 2025-05-28 DIAGNOSIS — H53.19 VISUAL SNOW SYNDROME: ICD-10-CM

## 2025-05-28 PROBLEM — L72.0 EPIDERMAL CYST OF FACE: Status: RESOLVED | Noted: 2024-02-26 | Resolved: 2025-05-28

## 2025-05-28 PROBLEM — R29.818 TRANSIENT NEUROLOGIC DEFICIT: Status: RESOLVED | Noted: 2024-09-12 | Resolved: 2025-05-28

## 2025-05-28 PROBLEM — Z30.41 ENCOUNTER FOR SURVEILLANCE OF CONTRACEPTIVE PILLS: Status: RESOLVED | Noted: 2021-12-07 | Resolved: 2025-05-28

## 2025-05-28 PROBLEM — R41.0 TRANSIENT CONFUSION: Status: RESOLVED | Noted: 2024-09-12 | Resolved: 2025-05-28

## 2025-05-28 PROBLEM — R20.2 PARESTHESIA: Status: RESOLVED | Noted: 2024-08-16 | Resolved: 2025-05-28

## 2025-05-28 LAB
ALBUMIN SERPL-MCNC: 5.3 G/DL (ref 3.2–4.8)
ALBUMIN/GLOB SERPL: 2.4 {RATIO} (ref 1–2)
ALP LIVER SERPL-CCNC: 62 U/L (ref 37–98)
ALT SERPL-CCNC: 13 U/L (ref 10–49)
ANION GAP SERPL CALC-SCNC: 10 MMOL/L (ref 0–18)
APTT PPP: 27.8 SECONDS (ref 23–36)
AST SERPL-CCNC: 19 U/L (ref ?–34)
BASOPHILS # BLD AUTO: 0.06 X10(3) UL (ref 0–0.2)
BASOPHILS NFR BLD AUTO: 0.9 %
BILIRUB SERPL-MCNC: 0.7 MG/DL (ref 0.3–1.2)
BUN BLD-MCNC: 14 MG/DL (ref 9–23)
BUN/CREAT SERPL: 12.8 (ref 10–20)
CALCIUM BLD-MCNC: 10.3 MG/DL (ref 8.7–10.4)
CHLORIDE SERPL-SCNC: 104 MMOL/L (ref 98–112)
CHOLEST SERPL-MCNC: 214 MG/DL (ref ?–200)
CO2 SERPL-SCNC: 27 MMOL/L (ref 21–32)
CREAT BLD-MCNC: 1.09 MG/DL (ref 0.55–1.02)
DEPRECATED RDW RBC AUTO: 43 FL (ref 35.1–46.3)
EGFRCR SERPLBLD CKD-EPI 2021: 70 ML/MIN/1.73M2 (ref 60–?)
EOSINOPHIL # BLD AUTO: 0.03 X10(3) UL (ref 0–0.7)
EOSINOPHIL NFR BLD AUTO: 0.5 %
ERYTHROCYTE [DISTWIDTH] IN BLOOD BY AUTOMATED COUNT: 13 % (ref 11–15)
FASTING PATIENT LIPID ANSWER: NO
FASTING STATUS PATIENT QL REPORTED: NO
GLOBULIN PLAS-MCNC: 2.2 G/DL (ref 2–3.5)
GLUCOSE BLD-MCNC: 75 MG/DL (ref 70–99)
HCT VFR BLD AUTO: 41.1 % (ref 35–48)
HDLC SERPL-MCNC: 56 MG/DL (ref 40–59)
HGB BLD-MCNC: 13.7 G/DL (ref 12–16)
IMM GRANULOCYTES # BLD AUTO: 0.02 X10(3) UL (ref 0–1)
IMM GRANULOCYTES NFR BLD: 0.3 %
INR BLD: 0.89 (ref 0.8–1.2)
LDLC SERPL CALC-MCNC: 136 MG/DL (ref ?–100)
LYMPHOCYTES # BLD AUTO: 2.09 X10(3) UL (ref 1–4)
LYMPHOCYTES NFR BLD AUTO: 33 %
MCH RBC QN AUTO: 30.2 PG (ref 26–34)
MCHC RBC AUTO-ENTMCNC: 33.3 G/DL (ref 31–37)
MCV RBC AUTO: 90.5 FL (ref 80–100)
MONOCYTES # BLD AUTO: 0.49 X10(3) UL (ref 0.1–1)
MONOCYTES NFR BLD AUTO: 7.7 %
NEUTROPHILS # BLD AUTO: 3.65 X10 (3) UL (ref 1.5–7.7)
NEUTROPHILS # BLD AUTO: 3.65 X10(3) UL (ref 1.5–7.7)
NEUTROPHILS NFR BLD AUTO: 57.6 %
NONHDLC SERPL-MCNC: 158 MG/DL (ref ?–130)
OSMOLALITY SERPL CALC.SUM OF ELEC: 291 MOSM/KG (ref 275–295)
PLATELET # BLD AUTO: 321 10(3)UL (ref 150–450)
POTASSIUM SERPL-SCNC: 3.9 MMOL/L (ref 3.5–5.1)
PROT SERPL-MCNC: 7.5 G/DL (ref 5.7–8.2)
PROTHROMBIN TIME: 12.6 SECONDS (ref 11.6–14.8)
RBC # BLD AUTO: 4.54 X10(6)UL (ref 3.8–5.3)
SODIUM SERPL-SCNC: 141 MMOL/L (ref 136–145)
TRIGL SERPL-MCNC: 123 MG/DL (ref 30–149)
VLDLC SERPL CALC-MCNC: 23 MG/DL (ref 0–30)
WBC # BLD AUTO: 6.3 X10(3) UL (ref 4–11)

## 2025-05-28 PROCEDURE — 85245 CLOT FACTOR VIII VW RISTOCTN: CPT

## 2025-05-28 PROCEDURE — 80061 LIPID PANEL: CPT

## 2025-05-28 PROCEDURE — 36415 COLL VENOUS BLD VENIPUNCTURE: CPT

## 2025-05-28 PROCEDURE — 85610 PROTHROMBIN TIME: CPT

## 2025-05-28 PROCEDURE — 80053 COMPREHEN METABOLIC PANEL: CPT

## 2025-05-28 PROCEDURE — 85246 CLOT FACTOR VIII VW ANTIGEN: CPT

## 2025-05-28 PROCEDURE — 85025 COMPLETE CBC W/AUTO DIFF WBC: CPT

## 2025-05-28 PROCEDURE — 85730 THROMBOPLASTIN TIME PARTIAL: CPT

## 2025-05-28 PROCEDURE — 85240 CLOT FACTOR VIII AHG 1 STAGE: CPT

## 2025-05-28 PROCEDURE — 99395 PREV VISIT EST AGE 18-39: CPT | Performed by: FAMILY MEDICINE

## 2025-05-28 NOTE — PROGRESS NOTES
The following individual(s) verbally consented to be recorded using ambient AI listening technology and understand that they can each withdraw their consent to this listening technology at any point by asking the clinician to turn off or pause the recording:    Patient name: Katy TURNER Melani  Additional names:

## 2025-05-28 NOTE — PROGRESS NOTES
Subjective:   Katy Polo is a 30 year old female who presents for Physical (Annual physical. No concerns.)       History/Other:   History of Present Illness  Katy Polo is a 30 year old female who presents for routine physical.    She experiences migraines and takes Ubrelvy as needed, which reduces her frequency. She has visual snow but is currently without a neurologist. The medication for visual snow did not help that condition but managed her migraines effectively.    She takes spironolactone 100 mg for acne, which is well-controlled. Her menstrual cycles are irregular, sometimes occurring every two weeks or lasting for extended periods, with heavy bleeding and easy bruising. There is no history of epistaxis or blood in the stool.    She takes Lexapro 20 mg for social anxiety disorder, which is beneficial. She underwent EEG testing for dizziness, which showed frequent signal flares but no seizure activity. She occasionally experiences tachycardia. Fatigue and muscle pain post-exertion have improved compared to previous years. No chest pain, breathing issues, or new neurological symptoms. No recent changes in visual snow symptoms.    She is traveling between [place] and [place], and is involved in music and coaching. She attends [employer] classes four to five times a week and is conscious about her diet, focusing on protein intake as a vegetarian.      Chief Complaint Reviewed and Verified  Nursing Notes Reviewed and   Verified  Tobacco Reviewed  Allergies Reviewed  Medications Reviewed    OB Status Reviewed         Tobacco:  She has never smoked tobacco.    Current Medications[1]           Review of Systems:  See above      Objective:   /75   Pulse 77   Ht 5' 6.54\" (1.69 m)   Wt 155 lb (70.3 kg)   LMP 05/07/2025   SpO2 98%   BMI 24.62 kg/m²    Estimated body mass index is 24.62 kg/m² as calculated from the following:    Height as of this encounter: 5' 6.54\" (1.69 m).    Weight as of  this encounter: 155 lb (70.3 kg).  Results  RADIOLOGY  No results found.       Physical Exam  Physical Exam  GENERAL: Normal appearance.  NECK: No cervical adenopathy.  CHEST: Clear to auscultation bilaterally.  CARDIOVASCULAR: Regular rate and rhythm, no murmurs.  BREAST: Normal appearance, no masses.  ABDOMEN: No hepatosplenomegaly.  EXTREMITIES: No edema.  SKIN: Scattered acnosis.      Assessment & Plan:   1. Routine physical examination (Primary)  -     Lipid Panel; Future; Expected date: 05/28/2025  -     CBC With Differential With Platelet; Future; Expected date: 05/28/2025  -     Comp Metabolic Panel (14); Future; Expected date: 05/28/2025  2. Visual snow syndrome  -     NEURO - INTERNAL  3. Migraine with aura and without status migrainosus, not intractable  -     NEURO - INTERNAL  4. Menorrhagia with irregular cycle  -     Von Willebrand Panel [E]; Future; Expected date: 05/28/2025  5. Bruising  -     Von Willebrand Panel [E]; Future; Expected date: 05/28/2025      Assessment & Plan  Assessment & Plan  Routine physical  Patient is single, sexually active with 1 male partner.  Using condoms but inconsistently.  Reviewed contraceptive options.  STD prevention discussed.  Pap smear up-to-date  Doing well with healthy diet and exercise.    Migraine with aura  Migraine with aura is well-controlled with Ubrelvy as needed for acute episodes. Unusual EEG findings are present, but no seizures have been confirmed. Previous neurologists have left, necessitating new neurology follow-up.  - Continue Ubrelvy as needed for migraines  - Refer to Dr. Sanders  for neurology follow-up  - Order MRI of the brain every other year unless new symptoms develop    Visual snow syndrome  Visual snow syndrome is not significantly impacted by current treatment, but migraine control is effective.  - Refer to Dr. Sanders for neurology follow-up    Irregular menstrual cycle  Irregular menstrual cycles likely secondary to spironolactone use, with  variable frequency and duration, and occasional prolonged bleeding. Discussed management options, including Mirena IUD or mini-pill, but no immediate changes planned.  - Consider Mirena IUD or mini-pill if menstrual irregularities become problematic    Easy bruising  Easy bruising with heavy menstrual periods. Potential bleeding disorders, including von Willebrand's disease, were discussed. Insurance issues are being resolved, and she is willing to proceed with testing. Noted excessive bleeding during procedures.  - Order complete blood count with platelets  - Order von Willebrand's disease testing  - Order liver function tests    Social anxiety disorder  Social anxiety disorder is well-managed with Lexapro 20 mg daily. She reports stability and no desire to change the current medication regimen.  - Continue Lexapro 20 mg daily    Acne  Acne is well-controlled with spironolactone 100 mg and minoxidil 2.5 mg. No significant side effects reported, and she is satisfied with the current treatment.  - Continue spironolactone 100 mg daily  - Continue minoxidil 2.5 mg daily    Fatigue and muscle pain  Intermittent fatigue and muscle pain occur post-exertion but are less severe than previously experienced. Current exercise regimen with [employer] is well-tolerated and beneficial.  - Continue current exercise regimen with [employer]            Return in about 6 months (around 11/28/2025) for Routine physical.      Lidia Garza MD              [1]   Current Outpatient Medications   Medication Sig Dispense Refill    lamoTRIgine 100 MG Oral Tab Take 1 tablet (100 mg total) by mouth daily. 90 tablet 0    spironolactone 100 MG Oral Tab Take 1 tablet (100 mg total) by mouth daily. 90 tablet 3    minoxidil 2.5 MG Oral Tab Take 0.5 tablets (1.25 mg total) by mouth daily. 45 tablet 1    escitalopram 20 MG Oral Tab Take 1 tablet (20 mg total) by mouth daily. 90 tablet 3    ubrogepant (UBRELVY) 100 MG Oral Tab Take one tablet  at onset of migraine.  May take additional tablet in 2 hours if needed.  Do not exceed two tablets per 24 hour period. 10 tablet 11

## 2025-05-29 ENCOUNTER — OFFICE VISIT (OUTPATIENT)
Dept: DERMATOLOGY CLINIC | Facility: CLINIC | Age: 30
End: 2025-05-29

## 2025-05-29 DIAGNOSIS — L64.9 ANDROGENETIC ALOPECIA: ICD-10-CM

## 2025-05-29 DIAGNOSIS — L65.0 TELOGEN EFFLUVIUM: Primary | ICD-10-CM

## 2025-05-29 PROCEDURE — 99214 OFFICE O/P EST MOD 30 MIN: CPT | Performed by: STUDENT IN AN ORGANIZED HEALTH CARE EDUCATION/TRAINING PROGRAM

## 2025-05-29 RX ORDER — MINOXIDIL 2.5 MG/1
1.25 TABLET ORAL DAILY
Qty: 45 TABLET | Refills: 1 | Status: SHIPPED | OUTPATIENT
Start: 2025-05-29

## 2025-05-29 NOTE — PROGRESS NOTES
Established Patient    Referred by: No referring provider defined for this encounter.    CHIEF COMPLAINT: Hairloss/Hair Thinning    HISTORY OF PRESENT ILLNESS: Katy Polo is a 30 year old female here for evaluation Hair loss.    1. Hair loss/Hair Thinning  Location: All over  Duration: 6 months  Signs and symptoms: Pt reports improvemenr  Current treatment: Minoxidil  Past treatments: None      Personal Dermatologic History  History of skin cancer: No  History of  atypical moles: No    FAMILY HISTORY:  History of melanoma: No    Past Medical History  Past Medical History:    Acne    Anxiety state    Depression with anxiety    Epidermal cyst of face    Heavy periods    Migraine    with aura     Migraines    Other acne    Paresthesia    Tachycardia    Transient confusion    Transient neurologic deficit       REVIEW OF SYSTEMS:  Constitutional: Denies fever, chills, unintentional weight loss.   Skin as per HPI    Medications  Current Outpatient Medications   Medication Sig Dispense Refill    lamoTRIgine 100 MG Oral Tab Take 1 tablet (100 mg total) by mouth daily. 90 tablet 0    spironolactone 100 MG Oral Tab Take 1 tablet (100 mg total) by mouth daily. 90 tablet 3    minoxidil 2.5 MG Oral Tab Take 0.5 tablets (1.25 mg total) by mouth daily. 45 tablet 1    escitalopram 20 MG Oral Tab Take 1 tablet (20 mg total) by mouth daily. 90 tablet 3    ubrogepant (UBRELVY) 100 MG Oral Tab Take one tablet at onset of migraine.  May take additional tablet in 2 hours if needed.  Do not exceed two tablets per 24 hour period. 10 tablet 11       PHYSICAL EXAM:  General: awake, alert, no acute distress  Neuropsych: appropriate mood and affect  Eyes: Sclerae anicteric, without conjunctival injection, eyelids unremarkable  Skin: Skin exam was performed today including the following: scalp. Pertinent findings include:   - with regrowth    ASSESSMENT & PLAN:  Pathophysiology of diagnoses discussed with patient.  Therapeutic options  reviewed. Risks, benefits, and alternatives discussed with patient. Instructions reviewed at length.    #Telogen Effluvium  #Androgenetic alopecia   - Recommended daily MVI and vitamin D 2000-5000IU   - Continue oral minoxidil 1.25 mg daily (half a tablet) with food  - Discussed medication usage, dosage, risks, benefits and side effects.  ADR discusssed, including, but not limited to:  hypotension, tachycardia, edema, and increased facial/body hirsutism.      Return to clinic: 6 months or sooner if something concerning arises     Kiel Dee MD    By signing my name below, Renae SANTIAGO MA,  attest that this documentation has been prepared under the direction and in the presence of Kiel Dee MD.   Electronically Signed: Renae MC MA, 5/29/2025, 10:49 AM.    I, Kiel Dee MD,  personally performed the services described in this documentation. All medical record entries made by the scribe were at my direction and in my presence.  I have reviewed the chart and agree that the record reflects my personal performance and is accurate and complete.  Kiel Dee MD, 5/29/2025, 12:05 PM

## 2025-05-30 LAB
FACTOR VIII ACT: 94 %
VWF AG: 25 %

## 2025-06-23 RX ORDER — LAMOTRIGINE 100 MG/1
100 TABLET ORAL DAILY
Qty: 90 TABLET | Refills: 1 | Status: SHIPPED | OUTPATIENT
Start: 2025-06-23

## 2025-06-23 NOTE — TELEPHONE ENCOUNTER
Refill passes per [facility] protocol.    No future appointments with primary care medicine    last office visit: 5/28/25

## 2025-06-23 NOTE — TELEPHONE ENCOUNTER
Patient called to request a refill for the following medication:     Medication Quantity Refills Start End   lamoTRIgine 100 MG Oral Tab 90 tablet 0 3/24/2025 --     Pharmacy: [facility] [address] [place] [address] - Phone number [phone]     Patient is completely out of the medication.

## (undated) DEVICE — SYSTEM SEMI RIG LNR 3000CC W/ EL AND SELF

## (undated) DEVICE — Device

## (undated) DEVICE — GLOVE GAMMEX PI HYBRID LF 8.0

## (undated) DEVICE — SOLUTION IRRIG 1000ML 0.9% NACL USP BTL

## (undated) DEVICE — PACK CDS HEAD

## (undated) DEVICE — TRAY PREP WET SKIN 4 COMPARTMENT 6 SPNG 2 TWL

## (undated) DEVICE — SUTURE PLN GUT SZ 5-0 L18IN ABSRB YELLOWISH .

## (undated) NOTE — MR AVS SNAPSHOT
[facility]  [address], [address] [place] [address]  [phone]               Thank you for choosing us for your health care visit with Earl Cuba.  Robinson Marr MD.  We are glad to serve you and happy to provide you with this summary Evening and weekend appointments for your exam are available. Noah Norton. ([address] [facility])  [address]    [place], [address] [place] [address] Balbir Salamanca * Notice: This list has 2 medication(s) that are the same as other medications prescribed for you. Read the directions carefully, and ask your doctor or other care provider to review them with you.             MyChart     Visit MyChart  You can acc

## (undated) NOTE — MR AVS SNAPSHOT
[facility]  [address], [address]  [phone]               Thank you for choosing us for your health care visit with Svitlana Riddle.  Tj Barger MD.  We are glad to serve you and happy to provide you with this summary medications prescribed for you. Read the directions carefully, and ask your doctor or other care provider to review them with you.          Where to Get Your Medications      These medications were sent to [id number] [facility]

## (undated) NOTE — LETTER
[facility] - [facility]  [address] [facility] [address]  [facility] [address]  PH: [phone]  FAX: [phone]   Consent to Procedure/Sedation    Date: __8/8/2024_____    Time: ___7:45 AM ___    1. I authorize the performance upon Katy Polo the following:  Urodynamics (UDS)      2. I authorize Dr. Leatha Orlando (and whomever is designated as the doctor’s assistant), to perform the above mentioned procedures.    3. If any unforeseen conditions arise during this procedure calling for additional procedures, operations, or medications (including anesthesia and blood transfusion), I  further request and authorize the doctor to do whatever he/she deems advisable in my interest.    4. I consent to the taking and reproduction of any photographs in the course of this procedure for professional purposes.    5. I consent to the administration of such sedation as may be considered necessary or advisable by the physician responsible for this service, with the exception of  _____________________________.    6. I have been informed by my doctor of the nature and purpose of this procedure/sedation, possible alternative methods of treatment, risk involved and possible complications.    7. If I have a Do Not Resuscitate (DNR) order in place, the physician and I (or the  individual authorized to consent on my behalf) will discuss and agree as to whether the  Do Not Resuscitate (DNR) order will remain in effect or will be discontinued during the  performance of the procedure and the applicable recovery period. If the Do Not  Resuscitate (DNR) order is discontinued and is to be reinstated following the  procedure/recovery period, the physician will determine when the applicable recovery  period ends for purposes of reinstating the Do Not Resuscitate (DNR) order.    8. In the event your procedure results in extended X-Ray/Fluoroscopy time, you may develop a skin reaction.    Signature of  Patient:  ___________________________    Signature of person authorized to consent for patient: Relationship to patient:  ___________________________    ___________________    Witness: ____________________     Date: ______________    Printed: 2024   7:45 AM    Patient Name: Katy TURNER Melani        : 2/15/1995       Medical Record #: [id number]

## (undated) NOTE — Clinical Note
Jacqui - I saw Katy today with voiding dysfunction. I've recommended bladder testing. I will work to manage her sx. I appreciate the opportunity to participate in her care. Thanks, Leatha

## (undated) NOTE — LETTER
10/18/2017      Dear [facility]lys[facility] Asencio team,    Gage Campuzano is under my medical care. I have referred her for evaluation of low-grade temperature elevation, malaise, headache, neck and thigh pain, and sinus tachycardia.     She had apparent insect bite on 10/8/1

## (undated) NOTE — LETTER
AUTHORIZATION FOR SURGICAL OPERATION OR OTHER PROCEDURE    1. I hereby authorize Dr. Lobo, and [facility] staff assigned to my case to perform the following operation and/or procedure at the [facility] site:    Right hallux cortisone injection   _______________________________________________________________________________________________      _______________________________________________________________________________________________    2.  My physician has explained the nature and purpose of the operation or other procedure, possible alternative methods of treatment, the risks involved, and the possibility of complication to me.  I acknowledge that no guarantee has been made as to the result that may be obtained.  3.  I recognize that, during the course of this operation, or other procedure, unforseen conditions may necessitate additional or different procedure than those listed above.  I, therefore, further authorize and request that the above named physician, his/her physician assistants or designees perform such procedures as are, in his/her professional opinion, necessary and desirable.  4.  Any tissue or organs removed in the operation or other procedure may be disposed of by and at the discretion of the [facility] and [facility].  5.  I understand that in the event of a medical emergency, I will be transported by local paramedics to [facility] or other hospital emergency department.  6.  I certify that I have read and fully understand the above consent to operation and/or other procedure.    7.  I acknowledge that my physician has explained sedation/analgesia administration to me including the risks and benefits.  I consent to the administration of sedation/analgesia as may be necessary or desirable in the judgement of my physician.    Witness signature: ___________________________________________________ Date:   ______/______/_____                    Time:  ________ A.M.  P.M.       Patient Name:  ______________________________________________________  (please print)      Patient signature:  ___________________________________________________             Relationship to Patient:           []  Parent    Responsible person                          []  Spouse  In case of minor or                    [] Other  _____________   Incompetent name:  __________________________________________________                               (please print)      _____________      Responsible person  In case of minor or  Incompetent signature:  _______________________________________________    Statement of Physician  My signature below affirms that prior to the time of the procedure, I have explained to the patient and/or his/her guardian, the risks and benefits involved in the proposed treatment and any reasonable alternative to the proposed treatment.  I have also explained the risks and benefits involved in the refusal of the proposed treatment and have answered the patient's questions.                        Date:  ______/______/_______  Provider                      Signature:  __________________________________________________________       Time:  ___________ A.M    P.M.

## (undated) NOTE — Clinical Note
EEG shows generalized/ bifrontal sharps intermittently suspicious for seizure activity. I would recommend repeating EEG extended 1 hour for further evaluation. Thanks!

## (undated) NOTE — LETTER
10/4/2017              [address] [facility] [facility] [address] [id number]         Dear Rohini Gamble,    This letter is to inform you that our office has made several attempts to reach you by phone without success.   We were attempting to contact you

## (undated) NOTE — Clinical Note
Thank you for the consult. I saw Ms. Amie Cross in the endocrine/diabetes clinic today. Please see attached my note. Please feel free to contact me with any questions. Thanks!

## (undated) NOTE — MR AVS SNAPSHOT
[facility]  [address], [address] [place] [address]  [phone]               Thank you for choosing us for your health care visit with Ealr Cuba.  Robinson Marr MD.  We are glad to serve you and happy to provide you with this summary it is extremely important to present documentation of the implant information at time of your appointment.   FOR FEMALES HAVING GADOLINIUM EXAMS :If you are breastfeeding and your exam requires an IV Contrast (Gadolinium) injection, you need to stop breastf be advised that they will not be able to accompany the child in the testing room. Parents will remain in the MRI waiting area and be reunited with the child upon completion of the MRI.               Allergies as of Jan 31, 2017     No Known Allergies Visit [facility] online at  [email or web address]

## (undated) NOTE — LETTER
[facility]idia Garza Md  [address] [facility] [address]  [place] [address]       01/29/24        Patient: Katy Polo   YOB: 1995   Date of Visit: 1/29/2024       Dear  Dr. Luiz MD,      Thank you for referring Katy Polo to my practice.  Please find my assessment and plan below.        ASSESSMENT AND PLAN    1. Epidermal cyst of face  Small subcentimeter epidermal cyst of the right cheek.  I did recommend excision under local anesthesia with reconstruction.  She understands the risk of poor cosmesis and a very minimal risk of recurrence.  She accepts these risks and wishes to proceed               Sincerely,   Rohan Yoon MD   [facility], [address] [address]    Document electronically generated by:  Rohan Yoon MD